# Patient Record
Sex: FEMALE | Race: WHITE | Employment: OTHER | URBAN - METROPOLITAN AREA
[De-identification: names, ages, dates, MRNs, and addresses within clinical notes are randomized per-mention and may not be internally consistent; named-entity substitution may affect disease eponyms.]

---

## 2024-01-15 ENCOUNTER — OFFICE VISIT (OUTPATIENT)
Dept: FAMILY MEDICINE CLINIC | Age: 74
End: 2024-01-15
Payer: MEDICARE

## 2024-01-15 VITALS
HEIGHT: 61 IN | SYSTOLIC BLOOD PRESSURE: 120 MMHG | BODY MASS INDEX: 23 KG/M2 | DIASTOLIC BLOOD PRESSURE: 80 MMHG | OXYGEN SATURATION: 99 % | TEMPERATURE: 97.3 F | HEART RATE: 83 BPM | WEIGHT: 121.8 LBS

## 2024-01-15 DIAGNOSIS — R10.13 DYSPEPSIA: ICD-10-CM

## 2024-01-15 DIAGNOSIS — Z78.0 MENOPAUSE: ICD-10-CM

## 2024-01-15 DIAGNOSIS — K80.20 CALCULUS OF GALLBLADDER WITHOUT CHOLECYSTITIS WITHOUT OBSTRUCTION: ICD-10-CM

## 2024-01-15 DIAGNOSIS — R70.0 ELEVATED ERYTHROCYTE SEDIMENTATION RATE: ICD-10-CM

## 2024-01-15 DIAGNOSIS — G89.29 CHRONIC LEFT SHOULDER PAIN: ICD-10-CM

## 2024-01-15 DIAGNOSIS — E78.1: ICD-10-CM

## 2024-01-15 DIAGNOSIS — Z12.31 SCREENING MAMMOGRAM FOR BREAST CANCER: ICD-10-CM

## 2024-01-15 DIAGNOSIS — Z00.00 INITIAL MEDICARE ANNUAL WELLNESS VISIT: Primary | ICD-10-CM

## 2024-01-15 DIAGNOSIS — M25.512 CHRONIC LEFT SHOULDER PAIN: ICD-10-CM

## 2024-01-15 DIAGNOSIS — R73.02 IMPAIRED GLUCOSE TOLERANCE (ORAL): ICD-10-CM

## 2024-01-15 DIAGNOSIS — M85.80 OSTEOPENIA, UNSPECIFIED LOCATION: ICD-10-CM

## 2024-01-15 DIAGNOSIS — E88.09 HYPERPROTEINEMIA: ICD-10-CM

## 2024-01-15 DIAGNOSIS — E87.6 HYPOKALEMIA: ICD-10-CM

## 2024-01-15 DIAGNOSIS — Z11.59 SCREENING FOR VIRAL DISEASE: ICD-10-CM

## 2024-01-15 PROBLEM — H43.819 POSTERIOR VITREOUS DETACHMENT: Status: ACTIVE | Noted: 2024-01-15

## 2024-01-15 PROBLEM — F33.42 RECURRENT MAJOR DEPRESSION IN FULL REMISSION (HCC): Status: ACTIVE | Noted: 2024-01-15

## 2024-01-15 PROBLEM — Z22.7 LATENT TUBERCULOSIS: Status: ACTIVE | Noted: 2024-01-15

## 2024-01-15 PROBLEM — H52.209 ASTIGMATISM: Status: ACTIVE | Noted: 2024-01-15

## 2024-01-15 PROBLEM — Z86.59 HISTORY OF DEPRESSION: Status: ACTIVE | Noted: 2024-01-15

## 2024-01-15 PROBLEM — J30.9 ALLERGIC RHINITIS: Status: ACTIVE | Noted: 2024-01-15

## 2024-01-15 PROBLEM — C73 FOLLICULAR CARCINOMA OF THYROID GLAND (HCC): Status: ACTIVE | Noted: 2024-01-15

## 2024-01-15 PROBLEM — N81.6 HERNIATION OF RECTUM INTO VAGINA: Status: ACTIVE | Noted: 2024-01-15

## 2024-01-15 PROBLEM — M81.0 OSTEOPOROSIS: Status: RESOLVED | Noted: 2024-01-15 | Resolved: 2024-01-15

## 2024-01-15 PROBLEM — B00.9 HERPES SIMPLEX: Status: ACTIVE | Noted: 2024-01-15

## 2024-01-15 PROBLEM — M75.01 ADHESIVE CAPSULITIS OF BOTH SHOULDERS: Status: ACTIVE | Noted: 2022-11-30

## 2024-01-15 PROBLEM — G56.00 CARPAL TUNNEL SYNDROME: Status: ACTIVE | Noted: 2024-01-15

## 2024-01-15 PROBLEM — H44.119 PANUVEITIS: Status: ACTIVE | Noted: 2024-01-15

## 2024-01-15 PROBLEM — Q76.5 CERVICAL RIB: Status: ACTIVE | Noted: 2024-01-15

## 2024-01-15 PROBLEM — H30.91: Status: ACTIVE | Noted: 2024-01-15

## 2024-01-15 PROBLEM — M75.02 ADHESIVE CAPSULITIS OF BOTH SHOULDERS: Status: ACTIVE | Noted: 2022-11-30

## 2024-01-15 PROBLEM — G90.50 COMPLEX REGIONAL PAIN SYNDROME TYPE I: Status: ACTIVE | Noted: 2024-01-15

## 2024-01-15 PROBLEM — R76.11 TUBERCULIN SKIN TEST POSITIVE: Status: ACTIVE | Noted: 2022-09-27

## 2024-01-15 PROBLEM — M81.0 OSTEOPOROSIS: Status: ACTIVE | Noted: 2024-01-15

## 2024-01-15 PROCEDURE — 1090F PRES/ABSN URINE INCON ASSESS: CPT | Performed by: FAMILY MEDICINE

## 2024-01-15 PROCEDURE — 1123F ACP DISCUSS/DSCN MKR DOCD: CPT | Performed by: FAMILY MEDICINE

## 2024-01-15 PROCEDURE — G0438 PPPS, INITIAL VISIT: HCPCS | Performed by: FAMILY MEDICINE

## 2024-01-15 PROCEDURE — G8427 DOCREV CUR MEDS BY ELIG CLIN: HCPCS | Performed by: FAMILY MEDICINE

## 2024-01-15 PROCEDURE — G8484 FLU IMMUNIZE NO ADMIN: HCPCS | Performed by: FAMILY MEDICINE

## 2024-01-15 PROCEDURE — 3017F COLORECTAL CA SCREEN DOC REV: CPT | Performed by: FAMILY MEDICINE

## 2024-01-15 PROCEDURE — 4004F PT TOBACCO SCREEN RCVD TLK: CPT | Performed by: FAMILY MEDICINE

## 2024-01-15 PROCEDURE — G8420 CALC BMI NORM PARAMETERS: HCPCS | Performed by: FAMILY MEDICINE

## 2024-01-15 PROCEDURE — 99204 OFFICE O/P NEW MOD 45 MIN: CPT | Performed by: FAMILY MEDICINE

## 2024-01-15 PROCEDURE — G8400 PT W/DXA NO RESULTS DOC: HCPCS | Performed by: FAMILY MEDICINE

## 2024-01-15 RX ORDER — FAMOTIDINE 40 MG/1
40 TABLET, FILM COATED ORAL 2 TIMES DAILY
Qty: 180 TABLET | Refills: 3 | Status: SHIPPED | OUTPATIENT
Start: 2024-01-15

## 2024-01-15 RX ORDER — ROSUVASTATIN CALCIUM 10 MG/1
10 TABLET, COATED ORAL DAILY
Qty: 90 TABLET | Refills: 1 | Status: SHIPPED | OUTPATIENT
Start: 2024-01-15

## 2024-01-15 RX ORDER — CONJUGATED ESTROGENS 0.62 MG/G
CREAM VAGINAL
Qty: 30 G | Refills: 5 | Status: SHIPPED | OUTPATIENT
Start: 2024-01-15 | End: 2024-01-16 | Stop reason: SDUPTHER

## 2024-01-15 RX ORDER — FAMOTIDINE 40 MG/1
40 TABLET, FILM COATED ORAL 2 TIMES DAILY
COMMUNITY
Start: 2023-07-24 | End: 2024-01-15 | Stop reason: SDUPTHER

## 2024-01-15 RX ORDER — SIMVASTATIN 10 MG
10 TABLET ORAL
COMMUNITY
Start: 2023-08-23 | End: 2024-01-15 | Stop reason: ALTCHOICE

## 2024-01-15 RX ORDER — ACETAMINOPHEN 500 MG
500 TABLET ORAL EVERY 6 HOURS PRN
COMMUNITY

## 2024-01-15 RX ORDER — LEVOTHYROXINE SODIUM 88 UG/1
88 TABLET ORAL
COMMUNITY
Start: 2023-01-17 | End: 2024-07-17

## 2024-01-15 RX ORDER — CONJUGATED ESTROGENS 0.62 MG/G
CREAM VAGINAL
COMMUNITY
Start: 2023-04-24 | End: 2024-01-15 | Stop reason: SDUPTHER

## 2024-01-15 SDOH — ECONOMIC STABILITY: HOUSING INSECURITY
IN THE LAST 12 MONTHS, WAS THERE A TIME WHEN YOU DID NOT HAVE A STEADY PLACE TO SLEEP OR SLEPT IN A SHELTER (INCLUDING NOW)?: NO

## 2024-01-15 SDOH — ECONOMIC STABILITY: FOOD INSECURITY: WITHIN THE PAST 12 MONTHS, YOU WORRIED THAT YOUR FOOD WOULD RUN OUT BEFORE YOU GOT MONEY TO BUY MORE.: NEVER TRUE

## 2024-01-15 SDOH — ECONOMIC STABILITY: FOOD INSECURITY: WITHIN THE PAST 12 MONTHS, THE FOOD YOU BOUGHT JUST DIDN'T LAST AND YOU DIDN'T HAVE MONEY TO GET MORE.: NEVER TRUE

## 2024-01-15 SDOH — ECONOMIC STABILITY: INCOME INSECURITY: HOW HARD IS IT FOR YOU TO PAY FOR THE VERY BASICS LIKE FOOD, HOUSING, MEDICAL CARE, AND HEATING?: NOT HARD AT ALL

## 2024-01-15 ASSESSMENT — PATIENT HEALTH QUESTIONNAIRE - PHQ9
2. FEELING DOWN, DEPRESSED OR HOPELESS: 0
1. LITTLE INTEREST OR PLEASURE IN DOING THINGS: 0
SUM OF ALL RESPONSES TO PHQ QUESTIONS 1-9: 0
SUM OF ALL RESPONSES TO PHQ9 QUESTIONS 1 & 2: 0
SUM OF ALL RESPONSES TO PHQ QUESTIONS 1-9: 0

## 2024-01-15 ASSESSMENT — VISUAL ACUITY: OD_CC: 20/30-0

## 2024-01-15 ASSESSMENT — LIFESTYLE VARIABLES
HOW MANY STANDARD DRINKS CONTAINING ALCOHOL DO YOU HAVE ON A TYPICAL DAY: PATIENT DOES NOT DRINK
HOW OFTEN DO YOU HAVE A DRINK CONTAINING ALCOHOL: NEVER

## 2024-01-15 NOTE — PROGRESS NOTES
Scan, and colonoscopy have been entered.   
highly suspicious of rheumatoid arthritis.  She does report a lot of muscle pain.  PMR is a possible diagnosis.  Orders:  -     C-Reactive Protein  -     Sedimentation Rate  Hypokalemia  Assessment & Plan:   Will monitor with a CMP.  Orders:  -     Comprehensive Metabolic Panel  Hyperproteinemia  Assessment & Plan:   Will check a CMP.  Orders:  -     Comprehensive Metabolic Panel  Menopause  Assessment & Plan:   Continue vaginal cream about twice a week  Orders:  -     estrogens conjugated (PREMARIN) 0.625 MG/GM CREA vaginal cream; See dose instructions in comments, # 30 g, 3 total refill(s), Acute, Disp-30 g, R-5, Normal  Impaired glucose tolerance (oral)  Assessment & Plan:   Documenting impaired glucose tolerance.  Navya type IV hyperlipoproteinemia  Assessment & Plan:   Will check a lipid panel  Orders:  -     rosuvastatin (CRESTOR) 10 MG tablet; Take 1 tablet by mouth daily, Disp-90 tablet, R-1Normal  -     Lipid Panel  Osteopenia, unspecified location  Assessment & Plan:   Recommended strength exercise.  Screening for viral disease  -     Hepatitis C Antibody  Dyspepsia  Assessment & Plan:   Continue famotidine twice a day which she states helps her.  Orders:  -     famotidine (PEPCID) 40 MG tablet; Take 1 tablet by mouth 2 times daily, Disp-180 tablet, R-3Normal  Screening mammogram for breast cancer  -     [facility] ZANDRA DIGITAL SCREEN BILATERAL; Future    Recommendations for Preventive Services Due: see orders and patient instructions/AVS.  Recommended screening schedule for the next 5-10 years is provided to the patient in written form: see Patient Instructions/AVS.     No follow-ups on file.     Subjective   See the note above.    Patient's complete Health Risk Assessment and screening values have been reviewed and are found in Flowsheets. The following problems were reviewed today and where indicated follow up appointments were made and/or referrals ordered.    Positive Risk Factor Screenings with

## 2024-01-15 NOTE — ASSESSMENT & PLAN NOTE
I will check an ESR and CRP.  She did not report enough stiffness for me to be highly suspicious of rheumatoid arthritis.  She does report a lot of muscle pain.  PMR is a possible diagnosis.

## 2024-01-15 NOTE — PATIENT INSTRUCTIONS
receiving TTY. The message is shown on a monitor.  Use text messaging, social media, and email if it is hard for you to communicate by telephone.  Try to learn a listening technique called speechreading. It is not lipreading. You pay attention to people's gestures, expressions, posture, and tone of voice. These clues can help you understand what a person is saying. Face the person you are talking to, and have them face you. Make sure the lighting is good. You need to see the other person's face clearly.  Think about counseling if you need help to adjust to your hearing loss.  When should you call for help?  Watch closely for changes in your health, and be sure to contact your doctor if:    You think your hearing is getting worse.     You have new symptoms, such as dizziness or nausea.   Where can you learn more?  Go to https://www.[employer].net/patientEd and enter R798 to learn more about \"Hearing Loss: Care Instructions.\"  Current as of: February 28, 2023               Content Version: 13.9  © 2006-2023 [employer].   Care instructions adapted under license by [employer]. If you have questions about a medical condition or this instruction, always ask your healthcare professional. [employer] disclaims any warranty or liability for your use of this information.           Learning About Vision Tests  What are vision tests?     The four most common vision tests are visual acuity tests, refraction, visual field tests, and color vision tests.  Visual acuity (sharpness) tests  These tests are used:  To see if you need glasses or contact lenses.  To monitor an eye problem.  To check an eye injury.  Visual acuity tests are done as part of routine exams. You may also have this test when you get your 's license or apply for some types of jobs.  Visual field tests  These tests are used:  To check for vision loss in any area of your range of vision.  To screen for certain eye diseases.  To

## 2024-01-15 NOTE — ASSESSMENT & PLAN NOTE
I will order x-rays of left shoulder and recommended that she get physical therapy for her left shoulder.  I suspect a supraspinatus tendon problem

## 2024-01-16 ENCOUNTER — TELEPHONE (OUTPATIENT)
Dept: FAMILY MEDICINE CLINIC | Age: 74
End: 2024-01-16

## 2024-01-16 DIAGNOSIS — Z78.0 MENOPAUSE: ICD-10-CM

## 2024-01-16 LAB
ALBUMIN SERPL-MCNC: 4.7 G/DL (ref 3.4–5)
ALBUMIN/GLOB SERPL: 1.1 {RATIO} (ref 1.1–2.2)
ALP SERPL-CCNC: 74 U/L (ref 40–129)
ALT SERPL-CCNC: 9 U/L (ref 10–40)
ANION GAP SERPL CALCULATED.3IONS-SCNC: 14 MMOL/L (ref 3–16)
AST SERPL-CCNC: 18 U/L (ref 15–37)
BILIRUB SERPL-MCNC: 0.4 MG/DL (ref 0–1)
BUN SERPL-MCNC: 9 MG/DL (ref 7–20)
CALCIUM SERPL-MCNC: 9.3 MG/DL (ref 8.3–10.6)
CHLORIDE SERPL-SCNC: 104 MMOL/L (ref 99–110)
CHOLEST SERPL-MCNC: 194 MG/DL (ref 0–199)
CO2 SERPL-SCNC: 22 MMOL/L (ref 21–32)
CREAT SERPL-MCNC: <0.5 MG/DL (ref 0.6–1.2)
CRP SERPL-MCNC: 11.1 MG/L (ref 0–5.1)
ERYTHROCYTE [SEDIMENTATION RATE] IN BLOOD BY WESTERGREN METHOD: 65 MM/HR (ref 0–30)
GFR SERPLBLD CREATININE-BSD FMLA CKD-EPI: >60 ML/MIN/{1.73_M2}
GLUCOSE SERPL-MCNC: 83 MG/DL (ref 70–99)
HCV AB SERPL QL IA: NORMAL
HDLC SERPL-MCNC: 63 MG/DL (ref 40–60)
LDLC SERPL CALC-MCNC: 88 MG/DL
POTASSIUM SERPL-SCNC: 4 MMOL/L (ref 3.5–5.1)
PROT SERPL-MCNC: 9.1 G/DL (ref 6.4–8.2)
SODIUM SERPL-SCNC: 140 MMOL/L (ref 136–145)
TRIGL SERPL-MCNC: 213 MG/DL (ref 0–150)
VLDLC SERPL CALC-MCNC: 43 MG/DL

## 2024-01-16 RX ORDER — CONJUGATED ESTROGENS 0.62 MG/G
CREAM VAGINAL
Qty: 30 G | Refills: 5 | Status: SHIPPED | OUTPATIENT
Start: 2024-01-16 | End: 2024-01-17 | Stop reason: SDUPTHER

## 2024-01-17 LAB
ALBUMIN SERPL ELPH-MCNC: 3.6 G/DL (ref 3.1–4.9)
ALPHA1 GLOB SERPL ELPH-MCNC: 0.3 G/DL (ref 0.2–0.4)
ALPHA2 GLOB SERPL ELPH-MCNC: 1 G/DL (ref 0.4–1.1)
B-GLOBULIN SERPL ELPH-MCNC: 1.4 G/DL (ref 0.9–1.6)
GAMMA GLOB SERPL ELPH-MCNC: 2.9 G/DL (ref 0.6–1.8)
SPE/IFE INTERPRETATION: NORMAL

## 2024-01-17 RX ORDER — CONJUGATED ESTROGENS 0.62 MG/G
CREAM VAGINAL
Qty: 30 G | Refills: 5 | Status: SHIPPED | OUTPATIENT
Start: 2024-01-17

## 2024-01-17 NOTE — TELEPHONE ENCOUNTER
Pharmacy needs more clarification on the estrogens conjugated (PREMARIN). They need an exact dosage on the sig. She states in the past the patient has had sigs that state for half a gram 2-3 times weekly. \"One applicator full\" is a total of 2 grams of medication so it would be quadrupling the dose of what the patient has taken in the past. Changing that to a dosage in grams should be the clarification they need.

## 2024-01-24 ENCOUNTER — HOSPITAL ENCOUNTER (OUTPATIENT)
Dept: INFUSION THERAPY | Age: 74
Discharge: HOME | End: 2024-01-24
Payer: MEDICARE

## 2024-01-24 ENCOUNTER — INITIAL CONSULT (OUTPATIENT)
Dept: ONCOLOGY | Age: 74
End: 2024-01-24
Payer: MEDICARE

## 2024-01-24 VITALS
WEIGHT: 120 LBS | OXYGEN SATURATION: 96 % | DIASTOLIC BLOOD PRESSURE: 81 MMHG | HEART RATE: 98 BPM | BODY MASS INDEX: 22.66 KG/M2 | TEMPERATURE: 97.2 F | HEIGHT: 61 IN | SYSTOLIC BLOOD PRESSURE: 159 MMHG

## 2024-01-24 DIAGNOSIS — R70.0 ELEVATED ERYTHROCYTE SEDIMENTATION RATE: ICD-10-CM

## 2024-01-24 DIAGNOSIS — C73 FOLLICULAR CARCINOMA OF THYROID GLAND (HCC): ICD-10-CM

## 2024-01-24 DIAGNOSIS — D72.829 LEUKOCYTOSIS, UNSPECIFIED TYPE: ICD-10-CM

## 2024-01-24 DIAGNOSIS — C73 FOLLICULAR CARCINOMA OF THYROID GLAND (HCC): Primary | ICD-10-CM

## 2024-01-24 LAB
ALBUMIN SERPL-MCNC: 4.6 GM/DL (ref 3.4–5)
ALP BLD-CCNC: 68 IU/L (ref 40–129)
ALT SERPL-CCNC: 9 U/L (ref 10–40)
ANION GAP SERPL CALCULATED.3IONS-SCNC: 13 MMOL/L (ref 7–16)
AST SERPL-CCNC: 17 IU/L (ref 15–37)
BILIRUB SERPL-MCNC: 0.3 MG/DL (ref 0–1)
BUN SERPL-MCNC: 6 MG/DL (ref 6–23)
CALCIUM SERPL-MCNC: 9.2 MG/DL (ref 8.3–10.6)
CHLORIDE BLD-SCNC: 103 MMOL/L (ref 99–110)
CO2: 23 MMOL/L (ref 21–32)
COMMENT: ABNORMAL
CREAT SERPL-MCNC: 0.5 MG/DL (ref 0.6–1.1)
DIFFERENTIAL TYPE: ABNORMAL
EOSINOPHILS ABSOLUTE: 0.1 K/CU MM
EOSINOPHILS RELATIVE PERCENT: 1 % (ref 0–3)
ERYTHROCYTE SEDIMENTATION RATE: 85 MM/HR (ref 0–30)
GFR SERPL CREATININE-BSD FRML MDRD: >60 ML/MIN/1.73M2
GLUCOSE SERPL-MCNC: 89 MG/DL (ref 70–99)
HCT VFR BLD CALC: 39.3 % (ref 37–47)
HEMOGLOBIN: 13 GM/DL (ref 12.5–16)
IGA: 607 MG/DL (ref 69–382)
IGG,SERUM: 2469 MG/DL (ref 723–1685)
IGM,SERUM: 275 MG/DL (ref 62–277)
LACTATE DEHYDROGENASE: 172 IU/L (ref 120–246)
LYMPHOCYTES ABSOLUTE: 1.6 K/CU MM
LYMPHOCYTES RELATIVE PERCENT: 30 % (ref 24–44)
MCH RBC QN AUTO: 30.4 PG (ref 27–31)
MCHC RBC AUTO-ENTMCNC: 33.1 % (ref 32–36)
MCV RBC AUTO: 91.8 FL (ref 78–100)
MONOCYTES ABSOLUTE: 1.2 K/CU MM
MONOCYTES RELATIVE PERCENT: 22 % (ref 0–4)
PDW BLD-RTO: 13.1 % (ref 11.7–14.9)
PLATELET # BLD: 263 K/CU MM (ref 140–440)
PMV BLD AUTO: 9 FL (ref 7.5–11.1)
POTASSIUM SERPL-SCNC: 3.7 MMOL/L (ref 3.5–5.1)
RBC # BLD: 4.28 M/CU MM (ref 4.2–5.4)
SEGMENTED NEUTROPHILS ABSOLUTE COUNT: 2.5 K/CU MM
SEGMENTED NEUTROPHILS RELATIVE PERCENT: 47 % (ref 36–66)
SODIUM BLD-SCNC: 139 MMOL/L (ref 135–145)
TOTAL PROTEIN: 9.3 GM/DL (ref 6.4–8.2)
TOTAL PROTEIN: 9.3 GM/DL (ref 6.4–8.2)
URATE SERPL-MCNC: 4.1 MG/DL (ref 2.6–6)
WBC # BLD: 5.4 K/CU MM (ref 4–10.5)

## 2024-01-24 PROCEDURE — 86320 SERUM IMMUNOELECTROPHORESIS: CPT

## 2024-01-24 PROCEDURE — 99202 OFFICE O/P NEW SF 15 MIN: CPT

## 2024-01-24 PROCEDURE — 80053 COMPREHEN METABOLIC PANEL: CPT

## 2024-01-24 PROCEDURE — 3017F COLORECTAL CA SCREEN DOC REV: CPT | Performed by: INTERNAL MEDICINE

## 2024-01-24 PROCEDURE — 85027 COMPLETE CBC AUTOMATED: CPT

## 2024-01-24 PROCEDURE — 1036F TOBACCO NON-USER: CPT | Performed by: INTERNAL MEDICINE

## 2024-01-24 PROCEDURE — 1090F PRES/ABSN URINE INCON ASSESS: CPT | Performed by: INTERNAL MEDICINE

## 2024-01-24 PROCEDURE — 1123F ACP DISCUSS/DSCN MKR DOCD: CPT | Performed by: INTERNAL MEDICINE

## 2024-01-24 PROCEDURE — 84550 ASSAY OF BLOOD/URIC ACID: CPT

## 2024-01-24 PROCEDURE — 84155 ASSAY OF PROTEIN SERUM: CPT

## 2024-01-24 PROCEDURE — G8427 DOCREV CUR MEDS BY ELIG CLIN: HCPCS | Performed by: INTERNAL MEDICINE

## 2024-01-24 PROCEDURE — 86038 ANTINUCLEAR ANTIBODIES: CPT

## 2024-01-24 PROCEDURE — G8484 FLU IMMUNIZE NO ADMIN: HCPCS | Performed by: INTERNAL MEDICINE

## 2024-01-24 PROCEDURE — 36415 COLL VENOUS BLD VENIPUNCTURE: CPT

## 2024-01-24 PROCEDURE — 85652 RBC SED RATE AUTOMATED: CPT

## 2024-01-24 PROCEDURE — 82232 ASSAY OF BETA-2 PROTEIN: CPT

## 2024-01-24 PROCEDURE — 83615 LACTATE (LD) (LDH) ENZYME: CPT

## 2024-01-24 PROCEDURE — 99204 OFFICE O/P NEW MOD 45 MIN: CPT | Performed by: INTERNAL MEDICINE

## 2024-01-24 PROCEDURE — G8420 CALC BMI NORM PARAMETERS: HCPCS | Performed by: INTERNAL MEDICINE

## 2024-01-24 PROCEDURE — 86430 RHEUMATOID FACTOR TEST QUAL: CPT

## 2024-01-24 PROCEDURE — 85007 BL SMEAR W/DIFF WBC COUNT: CPT

## 2024-01-24 PROCEDURE — 84165 PROTEIN E-PHORESIS SERUM: CPT

## 2024-01-24 PROCEDURE — 82784 ASSAY IGA/IGD/IGG/IGM EACH: CPT

## 2024-01-24 PROCEDURE — G8400 PT W/DXA NO RESULTS DOC: HCPCS | Performed by: INTERNAL MEDICINE

## 2024-01-24 PROCEDURE — 86039 ANTINUCLEAR ANTIBODIES (ANA): CPT

## 2024-01-24 PROCEDURE — 83883 ASSAY NEPHELOMETRY NOT SPEC: CPT

## 2024-01-24 ASSESSMENT — PATIENT HEALTH QUESTIONNAIRE - PHQ9
SUM OF ALL RESPONSES TO PHQ9 QUESTIONS 1 & 2: 0
SUM OF ALL RESPONSES TO PHQ QUESTIONS 1-9: 0
1. LITTLE INTEREST OR PLEASURE IN DOING THINGS: 0
SUM OF ALL RESPONSES TO PHQ QUESTIONS 1-9: 0
SUM OF ALL RESPONSES TO PHQ QUESTIONS 1-9: 0
2. FEELING DOWN, DEPRESSED OR HOPELESS: 0
SUM OF ALL RESPONSES TO PHQ QUESTIONS 1-9: 0

## 2024-01-25 LAB
B2 MICROGLOB SERPL-MCNC: 1.6 MG/L
RHEUMATOID FACTOR: 13 IU/ML (ref 0–14)
SMEAR REVIEW: NORMAL

## 2024-01-26 DIAGNOSIS — R76.8 ELEVATED ANTINUCLEAR ANTIBODY (ANA) LEVEL: Primary | ICD-10-CM

## 2024-01-26 LAB
ANA PATTERN: ABNORMAL
ANA TITER: ABNORMAL
ANTINUCLEAR ANTIBODY, HEP-2, IGG: DETECTED
INTERPRETATION: ABNORMAL
KAPPA LC FREE SER-MCNC: 40.14 MG/L (ref 3.3–19.4)
KAPPA LC FREE/LAMBDA FREE SER NEPH: 1.42 {RATIO} (ref 0.26–1.65)
LAMBDA LC FREE SERPL-MCNC: 28.25 MG/L (ref 5.71–26.3)
NUCLEAR IGG SER QL IA: DETECTED

## 2024-01-30 LAB
ALBUMIN SERPL ELPH-MCNC: 4.2 GM/DL (ref 3.2–5.6)
ALPHA-1-GLOBULIN: 0.3 GM/DL (ref 0.1–0.4)
ALPHA-2-GLOBULIN: 0.9 GM/DL (ref 0.4–1.2)
BETA GLOBULIN: 1.3 GM/DL (ref 0.5–1.3)
GAMMA GLOBULIN: 2.7 GM/DL (ref 0.5–1.6)
SPEP INTERPRETATION: ABNORMAL
SPEP INTERPRETATION: NORMAL
TOTAL PROTEIN: 9.3 GM/DL (ref 6.4–8.2)

## 2024-02-05 ENCOUNTER — OFFICE VISIT (OUTPATIENT)
Dept: FAMILY MEDICINE CLINIC | Age: 74
End: 2024-02-05
Payer: MEDICARE

## 2024-02-05 VITALS
DIASTOLIC BLOOD PRESSURE: 82 MMHG | BODY MASS INDEX: 22.66 KG/M2 | WEIGHT: 120 LBS | OXYGEN SATURATION: 100 % | TEMPERATURE: 97.5 F | HEART RATE: 76 BPM | SYSTOLIC BLOOD PRESSURE: 130 MMHG | HEIGHT: 61 IN

## 2024-02-05 DIAGNOSIS — R76.8 ELEVATED ANTINUCLEAR ANTIBODY (ANA) LEVEL: ICD-10-CM

## 2024-02-05 DIAGNOSIS — R70.0 ELEVATED ERYTHROCYTE SEDIMENTATION RATE: Primary | ICD-10-CM

## 2024-02-05 DIAGNOSIS — M25.512 CHRONIC LEFT SHOULDER PAIN: ICD-10-CM

## 2024-02-05 DIAGNOSIS — M85.80 OSTEOPENIA, UNSPECIFIED LOCATION: ICD-10-CM

## 2024-02-05 DIAGNOSIS — G89.29 CHRONIC LEFT SHOULDER PAIN: ICD-10-CM

## 2024-02-05 DIAGNOSIS — E78.1: ICD-10-CM

## 2024-02-05 PROCEDURE — 1090F PRES/ABSN URINE INCON ASSESS: CPT | Performed by: FAMILY MEDICINE

## 2024-02-05 PROCEDURE — G8400 PT W/DXA NO RESULTS DOC: HCPCS | Performed by: FAMILY MEDICINE

## 2024-02-05 PROCEDURE — 1036F TOBACCO NON-USER: CPT | Performed by: FAMILY MEDICINE

## 2024-02-05 PROCEDURE — G8484 FLU IMMUNIZE NO ADMIN: HCPCS | Performed by: FAMILY MEDICINE

## 2024-02-05 PROCEDURE — G8420 CALC BMI NORM PARAMETERS: HCPCS | Performed by: FAMILY MEDICINE

## 2024-02-05 PROCEDURE — G8427 DOCREV CUR MEDS BY ELIG CLIN: HCPCS | Performed by: FAMILY MEDICINE

## 2024-02-05 PROCEDURE — 3017F COLORECTAL CA SCREEN DOC REV: CPT | Performed by: FAMILY MEDICINE

## 2024-02-05 PROCEDURE — 1123F ACP DISCUSS/DSCN MKR DOCD: CPT | Performed by: FAMILY MEDICINE

## 2024-02-05 PROCEDURE — 99214 OFFICE O/P EST MOD 30 MIN: CPT | Performed by: FAMILY MEDICINE

## 2024-02-05 RX ORDER — MELOXICAM 7.5 MG/1
7.5 TABLET ORAL DAILY
Qty: 30 TABLET | Refills: 2 | Status: SHIPPED | OUTPATIENT
Start: 2024-02-05

## 2024-02-05 NOTE — ASSESSMENT & PLAN NOTE
Encouraged continuing vitamin D3 5000 units.   
 She has a high ESR and CRP and CONSTANTINO.  I will refer to a rheumatologist.  She also has high total protein in the blood with high gammaglobulin  
 She is doing well with rosuvastatin.  HLD is stable.  
Shoulder pain is not adequately controlled.  I will prescribe meloxicam for left shoulder pain.  I told her that if she has increased stomach symptoms that she may want to stop it.  
[id number]

## 2024-02-05 NOTE — PROGRESS NOTES
2/5/24    Mary Kate Rangel  1950    SUBJECTIVE    HPI Iggy Hartmann is a 73 y.o. female who presents today for evaluation of:  Chief Complaint   Patient presents with    Other     Blood work results      High CONSTANTINO : was told to follow up by hematologist who had a high CONSTANTINO titier result. +L shoulder pain. Duration 1 month. Rt arm has had pain in the past.no joint swelling. No rash. Increased L shoulder pauin L shoulder in the mornings.  She would like a medication that is an anti-inflammatory.    GERD : Her stomach symptoms are well-managed with 1 famotidine daily.  Her prescription says twice a day but she finds that once a day is all she needs.    HLD : She has switched from Zocor to rosuvastatin and is doing all right.    Allergies   Allergen Reactions    Adhesive Tape Rash    Iodine Rash     Other reaction(s): Rash    PER PATIENT THIS WAS REACTION TO IODINE SWAB AND LEFT SMALL SKIN DISTURBANCE. NO PRIOR HX OF        OBJECTIVE    /82 (Site: Left Upper Arm, Position: Sitting, Cuff Size: Medium Adult)   Pulse 76   Temp 97.5 °F (36.4 °C) (Infrared)   Ht 1.549 m (5' 1\")   Wt 54.4 kg (120 lb)   SpO2 100%   BMI 22.67 kg/m²     Physical Exam   Constitutional:       General: Not in acute distress.     Appearance: Normal appearance. Not ill-appearing.   Eyes:      General: No scleral icterus.  Cardiovascular:      Rate and Rhythm: Normal rate and regular rhythm.      Heart sounds: No murmur heard.   No friction rub. No gallop.    Pulmonary:      Effort: Pulmonary effort is normal. No respiratory distress.      Breath sounds: No wheezing, rhonchi or rales.   Abdominal:      Palpations: Abdomen is soft. There is no mass.      Tenderness: There is no abdominal tenderness.   Musculoskeletal:     Moves all extremities normally.  No tenderness or swelling L and R MCPJs. No arthritic nodules on fingers.   Skin:     General: Skin is warm.      Coloration: Skin is not jaundiced.   Neurological:      Mental Status: Patient is

## 2024-02-13 LAB — COMMENT: NORMAL

## 2024-02-22 ENCOUNTER — HOSPITAL ENCOUNTER (OUTPATIENT)
Dept: INFUSION THERAPY | Age: 74
Discharge: HOME | End: 2024-02-22
Payer: MEDICARE

## 2024-02-22 ENCOUNTER — OFFICE VISIT (OUTPATIENT)
Dept: ONCOLOGY | Age: 74
End: 2024-02-22
Payer: MEDICARE

## 2024-02-22 VITALS
BODY MASS INDEX: 22.47 KG/M2 | HEIGHT: 61 IN | WEIGHT: 119 LBS | DIASTOLIC BLOOD PRESSURE: 72 MMHG | HEART RATE: 73 BPM | OXYGEN SATURATION: 99 % | TEMPERATURE: 98.7 F | SYSTOLIC BLOOD PRESSURE: 137 MMHG

## 2024-02-22 DIAGNOSIS — R70.0 ELEVATED ERYTHROCYTE SEDIMENTATION RATE: ICD-10-CM

## 2024-02-22 DIAGNOSIS — D72.829 LEUKOCYTOSIS, UNSPECIFIED TYPE: ICD-10-CM

## 2024-02-22 DIAGNOSIS — C73 FOLLICULAR CARCINOMA OF THYROID GLAND (HCC): Primary | ICD-10-CM

## 2024-02-22 PROCEDURE — 99214 OFFICE O/P EST MOD 30 MIN: CPT | Performed by: INTERNAL MEDICINE

## 2024-02-22 PROCEDURE — G8400 PT W/DXA NO RESULTS DOC: HCPCS | Performed by: INTERNAL MEDICINE

## 2024-02-22 PROCEDURE — 3017F COLORECTAL CA SCREEN DOC REV: CPT | Performed by: INTERNAL MEDICINE

## 2024-02-22 PROCEDURE — G8428 CUR MEDS NOT DOCUMENT: HCPCS | Performed by: INTERNAL MEDICINE

## 2024-02-22 PROCEDURE — G8484 FLU IMMUNIZE NO ADMIN: HCPCS | Performed by: INTERNAL MEDICINE

## 2024-02-22 PROCEDURE — 1123F ACP DISCUSS/DSCN MKR DOCD: CPT | Performed by: INTERNAL MEDICINE

## 2024-02-22 PROCEDURE — G8420 CALC BMI NORM PARAMETERS: HCPCS | Performed by: INTERNAL MEDICINE

## 2024-02-22 PROCEDURE — 99211 OFF/OP EST MAY X REQ PHY/QHP: CPT

## 2024-02-22 PROCEDURE — 1090F PRES/ABSN URINE INCON ASSESS: CPT | Performed by: INTERNAL MEDICINE

## 2024-02-22 PROCEDURE — 1036F TOBACCO NON-USER: CPT | Performed by: INTERNAL MEDICINE

## 2024-02-22 NOTE — PROGRESS NOTES
Patient Name:  Mary Kate Rangel  Patient :  1950  Patient MRN:  [id number]     Primary Oncologist: Gabrielle Bloom MD  Referring Provider: Jc Tapia MD     Date of Service: 2024      Reason for Consult:  Elevated ESR      Chief Complaint:    Chief Complaint   Patient presents with    Follow-up     4 week follow up.        Encounter Diagnoses   Name Primary?    Follicular carcinoma of thyroid gland (HCC) Yes    Elevated erythrocyte sedimentation rate     Leukocytosis, unspecified type         HPI:   24: Very pleasant younger looking 73-year-old female arrived with her  to the clinic today.  She reported left shoulder pain, swelling for the past 4 months.  Restricting her range of motion.  Initially she had right shoulder pain which got better with therapy and antibiotics.  She denies any fever, night sweats, lymphadenopathy.  The weight fluctuates.  Denies any chest pain, increased shortness of breath, fever, cough.  No abdominal pain, nausea, vomiting, diarrhea, constipation.  No  symptoms.  No bleeding.  No lower extremity edema.  Denies any fatigue.    23: Osteopenia per pt. Is on tuan and vitamin D    2023 ESR 45 CMP within normal limits CBC with absolute monocyte count of 1000    2023 sed rate of 45.  Absolute monocyte count of 1700.    2023 CBC with WBC of 7.9 hemoglobin of 12.8 hematocrit 37.6 MCV of 90.3 and platelets of 256.  Absolute monocyte count of 1700.  Large platelets present.    1/15/2024 CMP with sodium of 140 potassium of 4 BUN of 9 creatinine 1.5 total protein of 9.1 albumin of 4.7, LFTs within normal limits hepatitis C negative C-reactive protein 11.1 sed rate of 65 serum protein electrophoresis with no M protein.    Mammogram 2024 normal per her    4CBC WBC of 5.4 hemoglobin of 13 hematocrit of 39.4 MCV of 91.8 platelets of 9 absolute monocyte count 1200.  CMP with potassium of 3.7 creatinine 0.5 total protein of 9.3 albumin of

## 2024-02-22 NOTE — PROGRESS NOTES
MA Rooming Questions  Patient: Mary Kate Rangel  MRN: [id number]    Date: 2/22/2024        1. Do you have any new issues?   yes - If PT is there a diagnosis since pt started coming here. .          2. Do you need any refills on medications?    no    3. Have you had any imaging done since your last visit?   no    4. Have you been hospitalized or seen in the emergency room since your last visit here?   no    5. Did the patient have a depression screening completed today? No    No data recorded     PHQ-9 Given to (if applicable):               PHQ-9 Score (if applicable):                     [] Positive     []  Negative              Does question #9 need addressed (if applicable)                     [] Yes    []  No               Jesi Iglesias MA

## 2024-02-29 ENCOUNTER — HOSPITAL ENCOUNTER (OUTPATIENT)
Age: 74
Discharge: HOME | End: 2024-02-29
Payer: MEDICARE

## 2024-02-29 ENCOUNTER — HOSPITAL ENCOUNTER (OUTPATIENT)
Dept: GENERAL RADIOLOGY | Age: 74
Discharge: HOME | End: 2024-02-29
Payer: MEDICARE

## 2024-02-29 DIAGNOSIS — M25.512 CHRONIC LEFT SHOULDER PAIN: ICD-10-CM

## 2024-02-29 DIAGNOSIS — G89.29 CHRONIC LEFT SHOULDER PAIN: ICD-10-CM

## 2024-02-29 PROCEDURE — 73030 X-RAY EXAM OF SHOULDER: CPT

## 2024-04-23 ENCOUNTER — OFFICE VISIT (OUTPATIENT)
Dept: FAMILY MEDICINE CLINIC | Age: 74
End: 2024-04-23
Payer: MEDICARE

## 2024-04-23 VITALS
HEART RATE: 77 BPM | WEIGHT: 121 LBS | DIASTOLIC BLOOD PRESSURE: 72 MMHG | SYSTOLIC BLOOD PRESSURE: 130 MMHG | OXYGEN SATURATION: 99 % | BODY MASS INDEX: 22.86 KG/M2

## 2024-04-23 DIAGNOSIS — M85.89 OSTEOPENIA OF MULTIPLE SITES: ICD-10-CM

## 2024-04-23 DIAGNOSIS — E03.9 ACQUIRED HYPOTHYROIDISM: Chronic | ICD-10-CM

## 2024-04-23 DIAGNOSIS — M75.02 ADHESIVE CAPSULITIS OF BOTH SHOULDERS: ICD-10-CM

## 2024-04-23 DIAGNOSIS — M75.01 ADHESIVE CAPSULITIS OF BOTH SHOULDERS: ICD-10-CM

## 2024-04-23 DIAGNOSIS — J30.2 SEASONAL ALLERGIC RHINITIS, UNSPECIFIED TRIGGER: ICD-10-CM

## 2024-04-23 DIAGNOSIS — M25.512 CHRONIC LEFT SHOULDER PAIN: Primary | ICD-10-CM

## 2024-04-23 DIAGNOSIS — G89.29 CHRONIC LEFT SHOULDER PAIN: Primary | ICD-10-CM

## 2024-04-23 PROCEDURE — 3017F COLORECTAL CA SCREEN DOC REV: CPT | Performed by: FAMILY MEDICINE

## 2024-04-23 PROCEDURE — G8400 PT W/DXA NO RESULTS DOC: HCPCS | Performed by: FAMILY MEDICINE

## 2024-04-23 PROCEDURE — 1090F PRES/ABSN URINE INCON ASSESS: CPT | Performed by: FAMILY MEDICINE

## 2024-04-23 PROCEDURE — G8427 DOCREV CUR MEDS BY ELIG CLIN: HCPCS | Performed by: FAMILY MEDICINE

## 2024-04-23 PROCEDURE — 99213 OFFICE O/P EST LOW 20 MIN: CPT | Performed by: FAMILY MEDICINE

## 2024-04-23 PROCEDURE — 1036F TOBACCO NON-USER: CPT | Performed by: FAMILY MEDICINE

## 2024-04-23 PROCEDURE — G8420 CALC BMI NORM PARAMETERS: HCPCS | Performed by: FAMILY MEDICINE

## 2024-04-23 PROCEDURE — 1123F ACP DISCUSS/DSCN MKR DOCD: CPT | Performed by: FAMILY MEDICINE

## 2024-04-23 RX ORDER — VALACYCLOVIR HYDROCHLORIDE 1 G/1
TABLET, FILM COATED ORAL
COMMUNITY
Start: 2024-04-16

## 2024-04-23 RX ORDER — FEXOFENADINE HCL 180 MG/1
180 TABLET ORAL DAILY
Qty: 90 TABLET | Refills: 1 | Status: SHIPPED | OUTPATIENT
Start: 2024-04-23

## 2024-04-23 NOTE — ASSESSMENT & PLAN NOTE
She has some osteopenia and is treating it with Fosamax.  The osteopenia may be due to treatment of hypothyroidism but unfortunately since she has a history of thyroid cancer we need to give higher doses of levothyroxine.

## 2024-04-23 NOTE — PROGRESS NOTES
4/23/24    Mary Kate Rangel  1950    SUBJECTIVE    EMMA Hartmann is a 73 y.o. female who presents today for evaluation of:  Chief Complaint   Patient presents with    Follow-up     Patient has a question about a new vitamin      Left shoulder : limited range of motion and XR done at [facility] shows arthritis. Interested in steroid injection.     Cough : very mild cough, has a lot of phlegm. Little bits fo bood in the mucus.         Allergies   Allergen Reactions    Adhesive Tape Rash    Iodine Rash     Other reaction(s): Rash    PER PATIENT THIS WAS REACTION TO IODINE SWAB AND LEFT SMALL SKIN DISTURBANCE. NO PRIOR HX OF        OBJECTIVE    /72 (Site: Left Upper Arm, Position: Sitting, Cuff Size: Medium Adult)   Pulse 77   Wt 54.9 kg (121 lb)   SpO2 99%   BMI 22.86 kg/m²     Physical Exam   Constitutional:       General: Not in acute distress.     Appearance: Normal appearance. Not ill-appearing.   Eyes:      General: No scleral icterus.  HENT:      Head: Normocephalic.      Right Ear: wax blockage.      Left Ear: Tympanic membrane, ear canal and external ear normal.      Nose: Nose normal. Bilateral nares do not have any blood or scabbing apparent     Right Sinus: No maxillary sinus tenderness or frontal sinus tenderness.      Left Sinus: No maxillary sinus tenderness or frontal sinus tenderness.      Mouth/Throat:      Mouth: Mucous membranes are moist.      Pharynx: No oropharyngeal exudate, posterior oropharyngeal erythema or uvula swelling.      Tonsils: No tonsillar exudate or tonsillar abscesses.  Cardiovascular:      Rate and Rhythm: Normal rate and regular rhythm.      Heart sounds: No murmur heard.   No friction rub. No gallop.    Pulmonary:      Effort: Pulmonary effort is normal. No respiratory distress.      Breath sounds: No wheezing, rhonchi or rales.   Abdominal:      Palpations: Abdomen is soft. There is no mass.      Tenderness: There is no abdominal tenderness.   Musculoskeletal:     Moves all

## 2024-04-23 NOTE — ASSESSMENT & PLAN NOTE
She is managed by a doctor in the [place] system.  Most recent TSH was slightly low which is appropriate given her history of thyroid cancer.

## 2024-04-23 NOTE — ASSESSMENT & PLAN NOTE
X-rays done recently in the [facility] system did not show arthritis but x-rays done at the [place] system did show arthritis in the left shoulder.  Will refer to orthopedic specialist.  I am not sure if adhesive capsulitis would benefit from an injection but she is interested in an injection.  I will also put in a physical therapy referral which I think is a more appropriate treatment for adhesive capsulitis.

## 2024-04-23 NOTE — ASSESSMENT & PLAN NOTE
I think the most likely reason for her nasal mucus is seasonal allergic rhinitis.  I will put out a prescription for Allegra which hopefully will help.  Since she has an upcoming appointment with an ear nose throat doctor I recommended that she ask the ear nose throat doctor to check her nose as well

## 2024-05-06 ENCOUNTER — OFFICE VISIT (OUTPATIENT)
Dept: ORTHOPEDIC SURGERY | Age: 74
End: 2024-05-06
Payer: MEDICARE

## 2024-05-06 VITALS — WEIGHT: 120 LBS | HEIGHT: 62 IN | BODY MASS INDEX: 22.08 KG/M2 | OXYGEN SATURATION: 99 % | HEART RATE: 85 BPM

## 2024-05-06 DIAGNOSIS — M75.02 ADHESIVE CAPSULITIS OF LEFT SHOULDER: Primary | ICD-10-CM

## 2024-05-06 PROCEDURE — G8400 PT W/DXA NO RESULTS DOC: HCPCS | Performed by: STUDENT IN AN ORGANIZED HEALTH CARE EDUCATION/TRAINING PROGRAM

## 2024-05-06 PROCEDURE — 1090F PRES/ABSN URINE INCON ASSESS: CPT | Performed by: STUDENT IN AN ORGANIZED HEALTH CARE EDUCATION/TRAINING PROGRAM

## 2024-05-06 PROCEDURE — G8420 CALC BMI NORM PARAMETERS: HCPCS | Performed by: STUDENT IN AN ORGANIZED HEALTH CARE EDUCATION/TRAINING PROGRAM

## 2024-05-06 PROCEDURE — 1123F ACP DISCUSS/DSCN MKR DOCD: CPT | Performed by: STUDENT IN AN ORGANIZED HEALTH CARE EDUCATION/TRAINING PROGRAM

## 2024-05-06 PROCEDURE — G8427 DOCREV CUR MEDS BY ELIG CLIN: HCPCS | Performed by: STUDENT IN AN ORGANIZED HEALTH CARE EDUCATION/TRAINING PROGRAM

## 2024-05-06 PROCEDURE — 3017F COLORECTAL CA SCREEN DOC REV: CPT | Performed by: STUDENT IN AN ORGANIZED HEALTH CARE EDUCATION/TRAINING PROGRAM

## 2024-05-06 PROCEDURE — 99203 OFFICE O/P NEW LOW 30 MIN: CPT | Performed by: STUDENT IN AN ORGANIZED HEALTH CARE EDUCATION/TRAINING PROGRAM

## 2024-05-06 PROCEDURE — 1036F TOBACCO NON-USER: CPT | Performed by: STUDENT IN AN ORGANIZED HEALTH CARE EDUCATION/TRAINING PROGRAM

## 2024-05-06 NOTE — PROGRESS NOTES
Patient is a 73 y.o. year old female. Patient is in the office today with LT shoulder pain. Patient states that she has not had an injury. Patient states that she has had pain for a year. No prior hx of injury, sx or injection. Pain scale  6/10. She has limited ROM. Her pain in located on the anterior portion of her shoulder an into her upper arm. [facility] and [facility] and has continued exercises at home.   Occupation: retired  Dominant Hand: Right

## 2024-05-06 NOTE — PROGRESS NOTES
5/6/2024   No chief complaint on file.       History of Present Illness:                             Mary Kate Rangel is a 73 y.o. female right handed patient referred by PCP for evaluation and treatment left shoulder pain.  Patient denies any specific injury.  She states that her pain in the left shoulder has been progressive slowly over the last year.  She does have a history of right shoulder adhesive capsulitis the previous year that resolved with Medrol Dosepak as well as physical therapy.  She states that the left shoulder feels very similar to what the right shoulder felt like in the past.  She has had no intervention thus far.  She was seen in the emergency room at [facility] and was told that she had arthritis and was given exercises to do which she has been doing.  She states that no significant change in her range of motion or pain.  This is my first time seeing the patient.  No advanced imaging thus far.    Patient currently retired    No prior shoulder injections or surgery on the left.  No prior significant injury on the left.    The pain occurs every day and when active. Location of pain is diffusely throughout shoulder, mostly at the anterior joint line. No history of shoulder separation or dislocation. Symptoms are aggravated by ADL's, repetitive use, work at or above shoulder height. Symptoms are diminished by rest, avoiding the painful activities.     Limited activities include: lifting, repetitive use, work at or above shoulder height, difficulty sleeping, difficulty with ADLs.  Patient does report stiffness.     Patient denies numbness, tingling, altered sensation in the extremity.    Related history: negative for prior surgery, trauma, arthritis or disorders.     Is affecting ADLs.  Pain is 6/10 at it's worst.    Outside reports reviewed: Outside imaging reviewed.     Patient's medications, allergies, past medical, surgical, social and family histories were reviewed and updated as appropriate.  CHIEF COMPLAINT:  Gynecologic examination and follow-up of birth control     SUBJECTIVE:  Criselda Rodriguez is a 34 year old       seen today for annual gynecologic examination and follow-up of birth control pills.  She is a long-time patient of Dr. Duong and has been seen here regularly.    Currently on the generic form of Reclipsen-Isibloom and has minimal periods.  She is trying to encourage her  to get a vasectomy  Has minimal bleeding, no abnormal discharge.    She is G 2 P 2002- x2-children now 12 and 7.  She had PIH and polyhydramnios with the 1st pregnancy resulting in  premature rupture of the membranes at 36 weeks  LMP:  Minimal menses on OCPs  Last pap:  11/10/2021-normal with negative HPV Co testing; remote history of ASCUS but Paps have been normal since.  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  Constitutional: Negative for fever and chills.   Skin: Negative for rash.   HEENT: Negative for sore throat.  Respiratory: Negative cough or shortness of breath.    Gastrointestinal: Negative for nausea, vomiting, diarrhea.        OBJECTIVE:  PAST HISTORIES:  Allergies, Medications, Medical History, Surgical History, Social History and Family History were reviewed and updated.    PHYSICAL EXAM:    Vital Signs:    Visit Vitals  /76   Ht 5' 9\" (1.753 m)   Wt 90.9 kg (200 lb 6.4 oz)   LMP 2022 (Exact Date)   BMI 29.59 kg/m²     General: Alert, cooperative, conversive.  Skin:  Warm and dry without rash.    Head:  Normocephalic-atraumatic.   Neck:  No masses or thyromegaly  Breasts:  No masses, tenderness or nipple discharge bilaterally  Gastrointestinal:  Soft and nontender.   No hepatomegaly or splenomegaly.   Psychiatric:   Cooperative.  Appropriate mood & affect.  :  GYNECOLOGIC: External genitalia show no lesions.  URETHRAL MEATUS: No polyps and well supported.  LABIAL MINORA/MAJORA: Normal, no lesions, atrophy absent  SKENE'S GLANDS: No erythema present.  VAGINA:  Normal mucosa/normal discharge.  CERVIX: Normal and without  lesions or masses   UTERUS: Normal size, mobile , non-tender, midplane  ADNEXA: No masses  and no tenderness  RECTAL:  Mild external hemorrhoids.      ASSESSMENT/PLAN:  1. Gynecologic exam normal    2. General counseling and advice on female contraception         --breast and pelvic exam as noted; Pap was not done  --Pap last year was normal and continue every 3 years  --continue on  current birth control pill as she has minimal menses  --return in 1 year for gyn care

## 2024-05-06 NOTE — PATIENT INSTRUCTIONS
Frozen shoulder/ Adhesive capsulitis  Medrol dose shara sent to your pharmacy.  Order for physical therapy.   Follow up in 2 months

## 2024-05-07 ASSESSMENT — ENCOUNTER SYMPTOMS
VOMITING: 0
WHEEZING: 0
COLOR CHANGE: 0
STRIDOR: 0
FACIAL SWELLING: 0
SHORTNESS OF BREATH: 0
ABDOMINAL PAIN: 0
BACK PAIN: 0
EYE PAIN: 0
NAUSEA: 0
EYE REDNESS: 0
COUGH: 0
PHOTOPHOBIA: 0

## 2024-05-08 ENCOUNTER — TELEPHONE (OUTPATIENT)
Dept: ORTHOPEDIC SURGERY | Age: 74
End: 2024-05-08

## 2024-05-08 RX ORDER — METHYLPREDNISOLONE 4 MG/1
TABLET ORAL
Qty: 1 KIT | Refills: 0 | Status: SHIPPED | OUTPATIENT
Start: 2024-05-08 | End: 2024-05-14

## 2024-05-26 NOTE — PROGRESS NOTES
RHEUMATOLOGY NEW PATIENT VISIT    2024      Patient Name: Mary Kate Rangel  : 1950  Medical Record: [id number]      CHIEF COMPLAINT    Chronic left shoulder pain  Elevated ESR  Positive CONSTANTINO 1: 320    Pertinent Problems  HX of follicular carcinoma s/p thyroidectomy   S/p hysterectomy   S/p tonsillectomy   Hx of uveitis ~ 15 years ago    HISTORY OF PRESENT ILLNESS    Mary Kate Rangel is a 73 y.o. female who was referred by Gabrielle Bloom. Symptom onset > 10 years ago with bilateral knee pain then hand, shoulders and rib pain began. She saw ortho doctor for bilateral shoulder who recommended PT that initially helped her right shoulder. Repeat PT fairly helped her left shoulder. She is also undergoing w/u for elevated ESR with oncology. So far she takes tylenol only for joint pain.    There is left shoulder pain   Omega supplements have helped other joint pain  Overall Discomfort: 3/10   Swelling: denies  AM stiffness: in left shoulder, lasting up to an hour  Improved with: activity  Worse with: rest     Hair loss: Denies  Oral Ulcers: Denies  Dry eyes and mouth: Denies  Rashes: Denies  Photosensitivity: Denies   Photophobia: Denies  Raynaud's: Denies  Chest Pain: Denies  SOB: Denies  Blood Clots: Denies  Seizures/strokes: Denies  Kidney Disease:  denies  Anemia/thrombocytopenia/leukopenia: Denies  Antibodies: CONSTANTINO +     Denies smoking, etoh, recreational drug use. There is no family hx of lupus, RA, PsA or CTD.      Current rheum meds:  none     Past rheum meds:          No data to display                    REVIEW OF SYSTEMS     Constitutional:  Denies fever or chills, decreased appetite, or weight loss   Eyes:  Denies change in visual acuity or eye dryness or irritation  HENT:  Denies dry mouth or oral ulcers  Respiratory:  Denies cough or shortness of breath   Cardiovascular:  Denies chest pain or edema   GI:  Denies abdominal pain, nausea, vomiting, bloody stools or diarrhea   :  Denies dysuria or 
no

## 2024-05-30 ENCOUNTER — OFFICE VISIT (OUTPATIENT)
Dept: RHEUMATOLOGY | Age: 74
End: 2024-05-30
Payer: MEDICARE

## 2024-05-30 ENCOUNTER — HOSPITAL ENCOUNTER (OUTPATIENT)
Age: 74
Discharge: HOME | End: 2024-05-30
Payer: MEDICARE

## 2024-05-30 VITALS
BODY MASS INDEX: 22.04 KG/M2 | OXYGEN SATURATION: 98 % | WEIGHT: 120.5 LBS | HEART RATE: 77 BPM | SYSTOLIC BLOOD PRESSURE: 120 MMHG | DIASTOLIC BLOOD PRESSURE: 70 MMHG

## 2024-05-30 DIAGNOSIS — R70.0 ELEVATED ERYTHROCYTE SEDIMENTATION RATE: ICD-10-CM

## 2024-05-30 DIAGNOSIS — M25.50 POLYARTHRALGIA: ICD-10-CM

## 2024-05-30 DIAGNOSIS — R76.8 ANA POSITIVE: Primary | ICD-10-CM

## 2024-05-30 DIAGNOSIS — Z01.89 ENCOUNTER FOR OTHER SPECIFIED SPECIAL EXAMINATIONS: ICD-10-CM

## 2024-05-30 DIAGNOSIS — R76.8 ANA POSITIVE: ICD-10-CM

## 2024-05-30 DIAGNOSIS — M89.9 DISORDER OF BONE, UNSPECIFIED: ICD-10-CM

## 2024-05-30 LAB
25(OH)D3 SERPL-MCNC: 35.74 NG/ML
ALBUMIN SERPL-MCNC: 4.5 GM/DL (ref 3.4–5)
ALBUMIN SERPL-MCNC: 4.5 GM/DL (ref 3.4–5)
ALP BLD-CCNC: 93 IU/L (ref 40–129)
ALT SERPL-CCNC: 10 U/L (ref 10–40)
ANION GAP SERPL CALCULATED.3IONS-SCNC: 12 MMOL/L (ref 7–16)
AST SERPL-CCNC: 19 IU/L (ref 15–37)
BILIRUB SERPL-MCNC: 0.1 MG/DL (ref 0–1)
BILIRUBIN DIRECT: 0.2 MG/DL (ref 0–0.3)
BILIRUBIN, INDIRECT: ABNORMAL MG/DL (ref 0–0.7)
BUN SERPL-MCNC: 9 MG/DL (ref 6–23)
CALCIUM SERPL-MCNC: 9.1 MG/DL (ref 8.3–10.6)
CHLORIDE BLD-SCNC: 104 MMOL/L (ref 99–110)
CO2: 24 MMOL/L (ref 21–32)
CREAT SERPL-MCNC: 0.5 MG/DL (ref 0.6–1.1)
CRP SERPL HS-MCNC: 5.5 MG/L
GFR, ESTIMATED: >90 ML/MIN/1.73M2
GLUCOSE SERPL-MCNC: 93 MG/DL (ref 70–99)
HAV IGM SERPL QL IA: NON REACTIVE
HBV CORE IGM SERPL QL IA: NON REACTIVE
HBV SURFACE AG SERPL QL IA: NON REACTIVE
HCT VFR BLD CALC: 42.2 % (ref 37–47)
HCV AB SERPL QL IA: NON REACTIVE
HEMOGLOBIN: 13.2 GM/DL (ref 12.5–16)
MCH RBC QN AUTO: 30.3 PG (ref 27–31)
MCHC RBC AUTO-ENTMCNC: 31.3 % (ref 32–36)
MCV RBC AUTO: 96.8 FL (ref 78–100)
PDW BLD-RTO: 13 % (ref 11.7–14.9)
PHOSPHORUS: 3.4 MG/DL (ref 2.5–4.9)
PLATELET # BLD: 267 K/CU MM (ref 140–440)
PMV BLD AUTO: 9.4 FL (ref 7.5–11.1)
POTASSIUM SERPL-SCNC: 3.8 MMOL/L (ref 3.5–5.1)
RBC # BLD: 4.36 M/CU MM (ref 4.2–5.4)
SED RATE, AUTOMATED: 32 MM/HR (ref 0–30)
SODIUM BLD-SCNC: 140 MMOL/L (ref 135–145)
TOTAL PROTEIN: 8.6 GM/DL (ref 6.4–8.2)
WBC # BLD: 6.4 K/CU MM (ref 4–10.5)

## 2024-05-30 PROCEDURE — 86200 CCP ANTIBODY: CPT

## 2024-05-30 PROCEDURE — 80053 COMPREHEN METABOLIC PANEL: CPT

## 2024-05-30 PROCEDURE — 99205 OFFICE O/P NEW HI 60 MIN: CPT | Performed by: STUDENT IN AN ORGANIZED HEALTH CARE EDUCATION/TRAINING PROGRAM

## 2024-05-30 PROCEDURE — 1123F ACP DISCUSS/DSCN MKR DOCD: CPT | Performed by: STUDENT IN AN ORGANIZED HEALTH CARE EDUCATION/TRAINING PROGRAM

## 2024-05-30 PROCEDURE — 85652 RBC SED RATE AUTOMATED: CPT

## 2024-05-30 PROCEDURE — 1090F PRES/ABSN URINE INCON ASSESS: CPT | Performed by: STUDENT IN AN ORGANIZED HEALTH CARE EDUCATION/TRAINING PROGRAM

## 2024-05-30 PROCEDURE — G8420 CALC BMI NORM PARAMETERS: HCPCS | Performed by: STUDENT IN AN ORGANIZED HEALTH CARE EDUCATION/TRAINING PROGRAM

## 2024-05-30 PROCEDURE — G8400 PT W/DXA NO RESULTS DOC: HCPCS | Performed by: STUDENT IN AN ORGANIZED HEALTH CARE EDUCATION/TRAINING PROGRAM

## 2024-05-30 PROCEDURE — 86162 COMPLEMENT TOTAL (CH50): CPT

## 2024-05-30 PROCEDURE — 85027 COMPLETE CBC AUTOMATED: CPT

## 2024-05-30 PROCEDURE — 86430 RHEUMATOID FACTOR TEST QUAL: CPT

## 2024-05-30 PROCEDURE — 86160 COMPLEMENT ANTIGEN: CPT

## 2024-05-30 PROCEDURE — 3017F COLORECTAL CA SCREEN DOC REV: CPT | Performed by: STUDENT IN AN ORGANIZED HEALTH CARE EDUCATION/TRAINING PROGRAM

## 2024-05-30 PROCEDURE — 86140 C-REACTIVE PROTEIN: CPT

## 2024-05-30 PROCEDURE — 80074 ACUTE HEPATITIS PANEL: CPT

## 2024-05-30 PROCEDURE — 82306 VITAMIN D 25 HYDROXY: CPT

## 2024-05-30 PROCEDURE — 36415 COLL VENOUS BLD VENIPUNCTURE: CPT

## 2024-05-30 PROCEDURE — 86480 TB TEST CELL IMMUN MEASURE: CPT

## 2024-05-30 PROCEDURE — 1036F TOBACCO NON-USER: CPT | Performed by: STUDENT IN AN ORGANIZED HEALTH CARE EDUCATION/TRAINING PROGRAM

## 2024-05-30 PROCEDURE — 84100 ASSAY OF PHOSPHORUS: CPT

## 2024-05-30 PROCEDURE — 82248 BILIRUBIN DIRECT: CPT

## 2024-05-30 PROCEDURE — G8427 DOCREV CUR MEDS BY ELIG CLIN: HCPCS | Performed by: STUDENT IN AN ORGANIZED HEALTH CARE EDUCATION/TRAINING PROGRAM

## 2024-06-01 LAB
C3 SERPL-MCNC: 151 MG/DL (ref 90–180)
C4 SERPL-MCNC: 28 MG/DL (ref 10–40)
COMPLEMENT TOTAL (CH50): 65.3 U/ML (ref 38.7–89.9)
CYCLIC CITRULLINE AB IGG/IGA: 110 UNITS (ref 0–19)
RHEUMATOID FACTOR: 12 IU/ML (ref 0–14)

## 2024-06-04 ENCOUNTER — OFFICE VISIT (OUTPATIENT)
Dept: RHEUMATOLOGY | Age: 74
End: 2024-06-04
Payer: MEDICARE

## 2024-06-04 ENCOUNTER — HOSPITAL ENCOUNTER (OUTPATIENT)
Age: 74
Discharge: HOME | End: 2024-06-04
Payer: MEDICARE

## 2024-06-04 VITALS
DIASTOLIC BLOOD PRESSURE: 65 MMHG | WEIGHT: 120 LBS | SYSTOLIC BLOOD PRESSURE: 125 MMHG | OXYGEN SATURATION: 99 % | BODY MASS INDEX: 21.95 KG/M2 | HEART RATE: 77 BPM

## 2024-06-04 DIAGNOSIS — M05.79 RHEUMATOID ARTHRITIS INVOLVING MULTIPLE SITES WITH POSITIVE RHEUMATOID FACTOR (HCC): Primary | ICD-10-CM

## 2024-06-04 DIAGNOSIS — R76.8 ANA POSITIVE: ICD-10-CM

## 2024-06-04 DIAGNOSIS — Z51.81 ENCOUNTER FOR MONITORING OF HYDROXYCHLOROQUINE THERAPY: ICD-10-CM

## 2024-06-04 DIAGNOSIS — R76.12 POSITIVE QUANTIFERON-TB GOLD TEST: ICD-10-CM

## 2024-06-04 DIAGNOSIS — Z79.899 ENCOUNTER FOR MONITORING OF HYDROXYCHLOROQUINE THERAPY: ICD-10-CM

## 2024-06-04 PROCEDURE — 1123F ACP DISCUSS/DSCN MKR DOCD: CPT | Performed by: STUDENT IN AN ORGANIZED HEALTH CARE EDUCATION/TRAINING PROGRAM

## 2024-06-04 PROCEDURE — 3017F COLORECTAL CA SCREEN DOC REV: CPT | Performed by: STUDENT IN AN ORGANIZED HEALTH CARE EDUCATION/TRAINING PROGRAM

## 2024-06-04 PROCEDURE — G8400 PT W/DXA NO RESULTS DOC: HCPCS | Performed by: STUDENT IN AN ORGANIZED HEALTH CARE EDUCATION/TRAINING PROGRAM

## 2024-06-04 PROCEDURE — 1090F PRES/ABSN URINE INCON ASSESS: CPT | Performed by: STUDENT IN AN ORGANIZED HEALTH CARE EDUCATION/TRAINING PROGRAM

## 2024-06-04 PROCEDURE — 1036F TOBACCO NON-USER: CPT | Performed by: STUDENT IN AN ORGANIZED HEALTH CARE EDUCATION/TRAINING PROGRAM

## 2024-06-04 PROCEDURE — 36415 COLL VENOUS BLD VENIPUNCTURE: CPT

## 2024-06-04 PROCEDURE — 86200 CCP ANTIBODY: CPT

## 2024-06-04 PROCEDURE — G8420 CALC BMI NORM PARAMETERS: HCPCS | Performed by: STUDENT IN AN ORGANIZED HEALTH CARE EDUCATION/TRAINING PROGRAM

## 2024-06-04 PROCEDURE — G8427 DOCREV CUR MEDS BY ELIG CLIN: HCPCS | Performed by: STUDENT IN AN ORGANIZED HEALTH CARE EDUCATION/TRAINING PROGRAM

## 2024-06-04 RX ORDER — HYDROXYCHLOROQUINE SULFATE 200 MG/1
200 TABLET, FILM COATED ORAL DAILY
Qty: 90 TABLET | Refills: 0 | Status: SHIPPED | OUTPATIENT
Start: 2024-06-04

## 2024-06-04 NOTE — PATIENT INSTRUCTIONS
Start Plaquenil   Complete CONSTANTINO comprehensive test   F/u with eye doctor  RTC in 2 months    [facility]  Dr. iMguel  P: [phone]  F: [phone]    Lab Location:    [place] [address]  You must be at the lab by 3:00 p.m.   for the [facility] labs to be completed.    To Schedule DEXA Bone Scan, CT or MRI  Call at Central [phone]

## 2024-06-04 NOTE — PROGRESS NOTES
swollen glands   Psychiatric:  Denies depression or anxiety       PROBLEM LIST    Patient Active Problem List   Diagnosis    Acquired hypothyroidism, Hx C    Adhesive capsulitis of both shoulders    Allergic rhinitis    Astigmatism    Calculus of gallbladder    Carpal tunnel syndrome    Cervical rib    Chorioretinitis of right eye    Complex regional pain syndrome type I    Follicular carcinoma of thyroid gland (HCC)    HLD, Navya type IV hyperlipoproteinemia    Herniation of rectum into vagina    Herpes simplex    Hyperproteinemia    Hypokalemia    Impaired glucose tolerance (oral)    Latent tuberculosis    Osteopenia    Panuveitis    Posterior vitreous detachment    History of depression    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 positive    Vitamin D deficiency, unspecified    Xerosis cutis    Chronic left shoulder pain    Elevated erythrocyte sedimentation rate    Menopause    Dyspepsia    Elevated antinuclear antibody (CONSTANTINO) level    Adhesive capsulitis of left shoulder       MEDICATIONS    Current Outpatient Medications   Medication Sig Dispense Refill    Omega 3 340 MG CPDR Take 1 1e11 [employer] by mouth Daily with lunch      valACYclovir (VALTREX) 1 g tablet       fexofenadine (ALLEGRA) 180 MG tablet Take 1 tablet by mouth daily 90 tablet 1    Cholecalciferol (VITAMIN D-3 PO) Take by mouth      estrogens conjugated (PREMARIN) 0.625 MG/GM CREA vaginal cream Place half gram per vagina every 2-3 days. 30 g 5    levothyroxine (SYNTHROID) 88 MCG tablet 1 tablet      acetaminophen (TYLENOL) 500 MG tablet Take 1 tablet by mouth every 6 hours as needed      Multiple Vitamins-Minerals (CENTRUM SILVER 50+WOMEN PO) Centrum Silver      famotidine (PEPCID) 40 MG tablet Take 1 tablet by mouth 2 times daily 180 tablet 3    rosuvastatin (CRESTOR) 10 MG tablet Take 1 tablet by mouth daily 90 tablet 1     No current facility-administered medications for this visit.       ALLERGIES    Allergies   Allergen Reactions    Adhesive Tape

## 2024-06-07 LAB
QUANTI TB1 MINUS NIL: 1.18 IU/ML
QUANTI TB2 MINUS NIL: 1.18 IU/ML
QUANTIFERON (R) TB GOLD (INCUBATED): POSITIVE IU/ML
QUANTIFERON NIL: 0.1 IU/ML

## 2024-06-13 ENCOUNTER — TELEPHONE (OUTPATIENT)
Dept: RHEUMATOLOGY | Age: 74
End: 2024-06-13

## 2024-06-13 NOTE — TELEPHONE ENCOUNTER
----- Message from Ruma Verma MA sent at 6/13/2024 11:15 AM EDT -----    ----- Message -----  From: Gulshan Ngo MD  Sent: 6/12/2024   5:24 AM EDT  To: Ruma Verma MA    I have put in an external referral for her to infectious diseases.  They could take the referral to [facility] infectious disease.  Patient needs chest x-ray to be completed.  ----- Message -----  From: Ruma Verma MA  Sent: 6/11/2024  10:14 AM EDT  To: Gulshan Ngo MD    Pt and her  state that the pt hasn't been treated for TB. Pt and  both advised that we are unable to safely prescribe immunosuppressive medications at this time. Pt and her  verbalized understanding. They have requested that she is referred to [facility] Infectious disease. Pts  states that he will call [facility] Infectious Disease himself.

## 2024-06-26 ENCOUNTER — HOSPITAL ENCOUNTER (OUTPATIENT)
Dept: INFUSION THERAPY | Age: 74
Discharge: HOME | End: 2024-06-26
Payer: MEDICARE

## 2024-06-26 ENCOUNTER — OFFICE VISIT (OUTPATIENT)
Dept: ONCOLOGY | Age: 74
End: 2024-06-26
Payer: MEDICARE

## 2024-06-26 VITALS
TEMPERATURE: 96.9 F | OXYGEN SATURATION: 100 % | SYSTOLIC BLOOD PRESSURE: 175 MMHG | DIASTOLIC BLOOD PRESSURE: 71 MMHG | HEIGHT: 62 IN | BODY MASS INDEX: 21.83 KG/M2 | WEIGHT: 118.6 LBS | HEART RATE: 85 BPM

## 2024-06-26 DIAGNOSIS — D72.829 LEUKOCYTOSIS, UNSPECIFIED TYPE: ICD-10-CM

## 2024-06-26 DIAGNOSIS — R70.0 ELEVATED ERYTHROCYTE SEDIMENTATION RATE: ICD-10-CM

## 2024-06-26 DIAGNOSIS — C73 FOLLICULAR CARCINOMA OF THYROID GLAND (HCC): Primary | ICD-10-CM

## 2024-06-26 LAB
ALBUMIN SERPL-MCNC: 4.4 GM/DL (ref 3.4–5)
ALP BLD-CCNC: 76 IU/L (ref 40–129)
ALT SERPL-CCNC: 9 U/L (ref 10–40)
ANION GAP SERPL CALCULATED.3IONS-SCNC: 13 MMOL/L (ref 7–16)
AST SERPL-CCNC: 19 IU/L (ref 15–37)
BASOPHILS ABSOLUTE: 0.1 K/CU MM
BASOPHILS RELATIVE PERCENT: 2.2 % (ref 0–1)
BILIRUB SERPL-MCNC: 0.2 MG/DL (ref 0–1)
BUN SERPL-MCNC: 11 MG/DL (ref 6–23)
CALCIUM SERPL-MCNC: 9.5 MG/DL (ref 8.3–10.6)
CHLORIDE BLD-SCNC: 101 MMOL/L (ref 99–110)
CO2: 23 MMOL/L (ref 21–32)
CREAT SERPL-MCNC: 0.5 MG/DL (ref 0.6–1.1)
DIFFERENTIAL TYPE: ABNORMAL
EOSINOPHILS ABSOLUTE: 0.1 K/CU MM
EOSINOPHILS RELATIVE PERCENT: 1.1 % (ref 0–3)
GFR, ESTIMATED: >90 ML/MIN/1.73M2
GLUCOSE SERPL-MCNC: 98 MG/DL (ref 70–99)
HCT VFR BLD CALC: 43.2 % (ref 37–47)
HEMOGLOBIN: 13.7 GM/DL (ref 12.5–16)
IGA: 560 MG/DL (ref 69–382)
IGG,SERUM: 1809 MG/DL (ref 723–1685)
IGM,SERUM: 270 MG/DL (ref 62–277)
LACTATE DEHYDROGENASE: 188 IU/L (ref 120–246)
LYMPHOCYTES ABSOLUTE: 1.9 K/CU MM
LYMPHOCYTES RELATIVE PERCENT: 34.1 % (ref 24–44)
MCH RBC QN AUTO: 30.9 PG (ref 27–31)
MCHC RBC AUTO-ENTMCNC: 31.7 % (ref 32–36)
MCV RBC AUTO: 97.3 FL (ref 78–100)
MONOCYTES ABSOLUTE: 1.3 K/CU MM
MONOCYTES RELATIVE PERCENT: 22.8 % (ref 0–4)
NEUTROPHILS ABSOLUTE: 2.2 K/CU MM
NEUTROPHILS RELATIVE PERCENT: 39.8 % (ref 36–66)
PDW BLD-RTO: 12.6 % (ref 11.7–14.9)
PLATELET # BLD: 220 K/CU MM (ref 140–440)
PMV BLD AUTO: 9.4 FL (ref 7.5–11.1)
POTASSIUM SERPL-SCNC: 3.7 MMOL/L (ref 3.5–5.1)
RBC # BLD: 4.44 M/CU MM (ref 4.2–5.4)
SODIUM BLD-SCNC: 137 MMOL/L (ref 135–145)
TOTAL PROTEIN: 8.4 GM/DL (ref 6.4–8.2)
WBC # BLD: 5.5 K/CU MM (ref 4–10.5)

## 2024-06-26 PROCEDURE — 82784 ASSAY IGA/IGD/IGG/IGM EACH: CPT

## 2024-06-26 PROCEDURE — G8420 CALC BMI NORM PARAMETERS: HCPCS | Performed by: INTERNAL MEDICINE

## 2024-06-26 PROCEDURE — 80053 COMPREHEN METABOLIC PANEL: CPT

## 2024-06-26 PROCEDURE — 84155 ASSAY OF PROTEIN SERUM: CPT

## 2024-06-26 PROCEDURE — 84165 PROTEIN E-PHORESIS SERUM: CPT

## 2024-06-26 PROCEDURE — G8400 PT W/DXA NO RESULTS DOC: HCPCS | Performed by: INTERNAL MEDICINE

## 2024-06-26 PROCEDURE — 1036F TOBACCO NON-USER: CPT | Performed by: INTERNAL MEDICINE

## 2024-06-26 PROCEDURE — 99211 OFF/OP EST MAY X REQ PHY/QHP: CPT

## 2024-06-26 PROCEDURE — G8427 DOCREV CUR MEDS BY ELIG CLIN: HCPCS | Performed by: INTERNAL MEDICINE

## 2024-06-26 PROCEDURE — 99214 OFFICE O/P EST MOD 30 MIN: CPT | Performed by: INTERNAL MEDICINE

## 2024-06-26 PROCEDURE — 83615 LACTATE (LD) (LDH) ENZYME: CPT

## 2024-06-26 PROCEDURE — 85025 COMPLETE CBC W/AUTO DIFF WBC: CPT

## 2024-06-26 PROCEDURE — 36415 COLL VENOUS BLD VENIPUNCTURE: CPT

## 2024-06-26 PROCEDURE — 1090F PRES/ABSN URINE INCON ASSESS: CPT | Performed by: INTERNAL MEDICINE

## 2024-06-26 PROCEDURE — 1123F ACP DISCUSS/DSCN MKR DOCD: CPT | Performed by: INTERNAL MEDICINE

## 2024-06-26 PROCEDURE — 86320 SERUM IMMUNOELECTROPHORESIS: CPT

## 2024-06-26 PROCEDURE — 82232 ASSAY OF BETA-2 PROTEIN: CPT

## 2024-06-26 PROCEDURE — 83883 ASSAY NEPHELOMETRY NOT SPEC: CPT

## 2024-06-26 PROCEDURE — 3017F COLORECTAL CA SCREEN DOC REV: CPT | Performed by: INTERNAL MEDICINE

## 2024-06-26 NOTE — PROGRESS NOTES
MA Rooming Questions  Patient: Mary Kate Rangel  MRN: [id number]    Date: 6/26/2024        1. Do you have any new issues?   no         2. Do you need any refills on medications?    no    3. Have you had any imaging done since your last visit?   no    4. Have you been hospitalized or seen in the emergency room since your last visit here?   no    5. Did the patient have a depression screening completed today? No    No data recorded     PHQ-9 Given to (if applicable):               PHQ-9 Score (if applicable):                     [] Positive     []  Negative              Does question #9 need addressed (if applicable)                     [] Yes    []  No               Talya Escudero CMA    
today's visit, > 30  mins.     NASEEM

## 2024-06-28 LAB
ALBUMIN SERPL ELPH-MCNC: 4.1 GM/DL (ref 3.2–5.6)
ALPHA-1-GLOBULIN: 0.3 GM/DL (ref 0.1–0.4)
ALPHA-2-GLOBULIN: 0.8 GM/DL (ref 0.4–1.2)
B2 MICROGLOB SERPL-MCNC: 1.7 MG/L
BETA GLOBULIN: 1.2 GM/DL (ref 0.5–1.3)
GAMMA GLOBULIN: 2.1 GM/DL (ref 0.5–1.6)
KAPPA LC FREE SER-MCNC: 28.92 MG/L (ref 3.3–19.4)
KAPPA LC FREE/LAMBDA FREE SER NEPH: 1.23 {RATIO} (ref 0.26–1.65)
LAMBDA LC FREE SERPL-MCNC: 23.57 MG/L (ref 5.71–26.3)
SPEP INTERPRETATION: ABNORMAL
SPEP INTERPRETATION: NORMAL
TOTAL PROTEIN: 8.4 GM/DL (ref 6.4–8.2)

## 2024-07-01 ENCOUNTER — HOSPITAL ENCOUNTER (OUTPATIENT)
Age: 74
Setting detail: SPECIMEN
Discharge: HOME | End: 2024-07-01
Payer: MEDICARE

## 2024-07-01 PROCEDURE — 86335 IMMUNFIX E-PHORSIS/URINE/CSF: CPT

## 2024-07-01 PROCEDURE — 83520 IMMUNOASSAY QUANT NOS NONAB: CPT

## 2024-07-01 PROCEDURE — 81050 URINALYSIS VOLUME MEASURE: CPT

## 2024-07-01 PROCEDURE — 84156 ASSAY OF PROTEIN URINE: CPT

## 2024-07-01 PROCEDURE — 84166 PROTEIN E-PHORESIS/URINE/CSF: CPT

## 2024-07-02 LAB
Lab: 21 HRS
Lab: 24 HRS
PROTEIN ELP 24 HOUR URINE: 74 MG/24 HR
VOLUME, (UVOL): 1400 MLS
VOLUME, (UVOL): 1425 MLS

## 2024-07-05 LAB
COLLECT DURATION TIME SPEC: NORMAL H
INTERPRETATION: NORMAL
KAPPA LC FREE 24H UR-MRATE: NORMAL MG/D
KAPPA LC FREE UR-MCNC: 13.53 MG/L (ref 0–32.9)
LAMBDA LC FREE 24H UR-MRATE: NORMAL MG/D
LAMBDA LC FREE UR-MCNC: 2.15 MG/L (ref 0–3.79)
PROT 24H UR-MRATE: NORMAL MG/D
SPECIMEN VOL ?TM UR: NORMAL ML

## 2024-07-08 ENCOUNTER — OFFICE VISIT (OUTPATIENT)
Dept: ORTHOPEDIC SURGERY | Age: 74
End: 2024-07-08
Payer: MEDICARE

## 2024-07-08 VITALS — WEIGHT: 117 LBS | HEIGHT: 62 IN | OXYGEN SATURATION: 99 % | BODY MASS INDEX: 21.53 KG/M2 | HEART RATE: 86 BPM

## 2024-07-08 DIAGNOSIS — M75.02 ADHESIVE CAPSULITIS OF LEFT SHOULDER: Primary | ICD-10-CM

## 2024-07-08 LAB
ALBUMIN, U: 100 %
ALPHA-1-GLOBULIN, U: 0 %
ALPHA-2-GLOBULIN, U: 0 %
BETA GLOBULIN, U: 0 %
GAMMA GLOBULIN, U: 0 %
PROTEIN ELP 24 HOUR URINE: 74 MG/24 HR
SPEP INTERPRETATION: NORMAL

## 2024-07-08 PROCEDURE — 99213 OFFICE O/P EST LOW 20 MIN: CPT | Performed by: STUDENT IN AN ORGANIZED HEALTH CARE EDUCATION/TRAINING PROGRAM

## 2024-07-08 PROCEDURE — 1036F TOBACCO NON-USER: CPT | Performed by: STUDENT IN AN ORGANIZED HEALTH CARE EDUCATION/TRAINING PROGRAM

## 2024-07-08 PROCEDURE — 3017F COLORECTAL CA SCREEN DOC REV: CPT | Performed by: STUDENT IN AN ORGANIZED HEALTH CARE EDUCATION/TRAINING PROGRAM

## 2024-07-08 PROCEDURE — G8427 DOCREV CUR MEDS BY ELIG CLIN: HCPCS | Performed by: STUDENT IN AN ORGANIZED HEALTH CARE EDUCATION/TRAINING PROGRAM

## 2024-07-08 PROCEDURE — G8400 PT W/DXA NO RESULTS DOC: HCPCS | Performed by: STUDENT IN AN ORGANIZED HEALTH CARE EDUCATION/TRAINING PROGRAM

## 2024-07-08 PROCEDURE — 1090F PRES/ABSN URINE INCON ASSESS: CPT | Performed by: STUDENT IN AN ORGANIZED HEALTH CARE EDUCATION/TRAINING PROGRAM

## 2024-07-08 PROCEDURE — G8420 CALC BMI NORM PARAMETERS: HCPCS | Performed by: STUDENT IN AN ORGANIZED HEALTH CARE EDUCATION/TRAINING PROGRAM

## 2024-07-08 PROCEDURE — 1123F ACP DISCUSS/DSCN MKR DOCD: CPT | Performed by: STUDENT IN AN ORGANIZED HEALTH CARE EDUCATION/TRAINING PROGRAM

## 2024-07-08 ASSESSMENT — ENCOUNTER SYMPTOMS
EYE REDNESS: 0
EYE PAIN: 0
COUGH: 0
WHEEZING: 0
SHORTNESS OF BREATH: 0
COLOR CHANGE: 0
BACK PAIN: 0
PHOTOPHOBIA: 0
NAUSEA: 0
FACIAL SWELLING: 0
VOMITING: 0
ABDOMINAL PAIN: 0
STRIDOR: 0

## 2024-07-08 NOTE — PROGRESS NOTES
7/8/2024   No chief complaint on file.     Updated HPI: Patient returns today for reevaluation of her left shoulder.  Patient states she is doing much better.  She is able to do all ADLs without significant issue.  She has been doing physical therapy formally as well as exercises on her own and she states that these been very helpful.  She has a recent diagnosis of rheumatoid arthritis and she is currently being treated for this by an outside physician.  No new injuries or complaints.  Again, states her range of motion is greatly improved since her last visit.    Previous HPI (5/6/2024):                             Mary Kate Rangel is a 73 y.o. female right handed patient referred by PCP for evaluation and treatment left shoulder pain.  Patient denies any specific injury.  She states that her pain in the left shoulder has been progressive slowly over the last year.  She does have a history of right shoulder adhesive capsulitis the previous year that resolved with Medrol Dosepak as well as physical therapy.  She states that the left shoulder feels very similar to what the right shoulder felt like in the past.  She has had no intervention thus far.  She was seen in the emergency room at [facility] and was told that she had arthritis and was given exercises to do which she has been doing.  She states that no significant change in her range of motion or pain.  This is my first time seeing the patient.  No advanced imaging thus far.    Patient currently retired    No prior shoulder injections or surgery on the left.  No prior significant injury on the left.    The pain occurs every day and when active. Location of pain is diffusely throughout shoulder, mostly at the anterior joint line. No history of shoulder separation or dislocation. Symptoms are aggravated by ADL's, repetitive use, work at or above shoulder height. Symptoms are diminished by rest, avoiding the painful activities.     Limited activities include: lifting,

## 2024-07-08 NOTE — PROGRESS NOTES
Pt comes in today for a 2 month follow up s/p LT shoulder adhesive capsulitis. Pain is 2/10 today. She states that she has been diagnosed  with rheumatoid arthritis since her last visit. She completed PT and is doing well.

## 2024-07-18 DIAGNOSIS — R70.0 ELEVATED ERYTHROCYTE SEDIMENTATION RATE: Primary | ICD-10-CM

## 2024-07-26 ENCOUNTER — TELEPHONE (OUTPATIENT)
Dept: ONCOLOGY | Age: 74
End: 2024-07-26

## 2024-07-26 NOTE — TELEPHONE ENCOUNTER
----- Message from Gabrielle Bloom MD sent at 7/18/2024  5:17 PM EDT -----  Labs jan 6 and apt 2 weeks later

## 2024-07-26 NOTE — TELEPHONE ENCOUNTER
7/26/24 - Dr Bloom wants to see the pt back on 1/6/25 for labs and a follow up 2 weeks later. I called the pt to schedule and she stated she was having eye surgery and she wanted to call back at a later date to schedule the appts. I gave her my name and direct line to call back.

## 2024-08-02 NOTE — PROGRESS NOTES
irritation  HENT:  Denies dry mouth or oral ulcers  Respiratory:  Denies cough or shortness of breath   Cardiovascular:  Denies chest pain or edema   GI:  Denies abdominal pain, nausea, vomiting, bloody stools or diarrhea   :  Denies dysuria or hematuria  Musculoskeletal:  See HPI  Integument:  Denies rash   Neurologic:  Denies headache, focal weakness or sensory changes   Endocrine:  Denies polyuria or polydipsia   Lymphatic:  Denies swollen glands   Psychiatric:  Denies depression or anxiety       PROBLEM LIST    Patient Active Problem List   Diagnosis    Acquired hypothyroidism, Hx C    Adhesive capsulitis of both shoulders    Allergic rhinitis    Astigmatism    Calculus of gallbladder    Carpal tunnel syndrome    Cervical rib    Chorioretinitis of right eye    Complex regional pain syndrome type I    Follicular carcinoma of thyroid gland (HCC)    HLD, Navya type IV hyperlipoproteinemia    Herniation of rectum into vagina    Herpes simplex    Hyperproteinemia    Hypokalemia    Impaired glucose tolerance (oral)    Latent tuberculosis    Osteopenia    Panuveitis    Posterior vitreous detachment    History of depression    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 positive    Vitamin D deficiency, unspecified    Xerosis cutis    Chronic left shoulder pain    Elevated erythrocyte sedimentation rate    Menopause    Dyspepsia    Elevated antinuclear antibody (CONSTANTINO) level    Adhesive capsulitis of left shoulder       MEDICATIONS    Current Outpatient Medications   Medication Sig Dispense Refill    hydroxychloroquine (PLAQUENIL) 200 MG tablet Take 1 tablet by mouth daily 90 tablet 0    Omega 3 340 MG CPDR Take 1 1e11 [employer] by mouth Daily with lunch      estrogens conjugated (PREMARIN) 0.625 MG/GM CREA vaginal cream Place half gram per vagina every 2-3 days. 30 g 5    levothyroxine (SYNTHROID) 88 MCG tablet 1 tablet      acetaminophen (TYLENOL) 500 MG tablet Take 1 tablet by mouth every 6 hours as needed      Multiple

## 2024-08-06 ENCOUNTER — OFFICE VISIT (OUTPATIENT)
Dept: RHEUMATOLOGY | Age: 74
End: 2024-08-06
Payer: MEDICARE

## 2024-08-06 VITALS
BODY MASS INDEX: 22.45 KG/M2 | RESPIRATION RATE: 16 BRPM | DIASTOLIC BLOOD PRESSURE: 68 MMHG | OXYGEN SATURATION: 99 % | HEART RATE: 74 BPM | HEIGHT: 62 IN | SYSTOLIC BLOOD PRESSURE: 116 MMHG | WEIGHT: 122 LBS

## 2024-08-06 DIAGNOSIS — R76.8 ANA POSITIVE: ICD-10-CM

## 2024-08-06 DIAGNOSIS — M05.79 RHEUMATOID ARTHRITIS INVOLVING MULTIPLE SITES WITH POSITIVE RHEUMATOID FACTOR (HCC): Primary | ICD-10-CM

## 2024-08-06 DIAGNOSIS — Z51.81 ENCOUNTER FOR MONITORING OF HYDROXYCHLOROQUINE THERAPY: ICD-10-CM

## 2024-08-06 DIAGNOSIS — Z79.899 ENCOUNTER FOR MONITORING OF HYDROXYCHLOROQUINE THERAPY: ICD-10-CM

## 2024-08-06 DIAGNOSIS — R79.82 ELEVATED C-REACTIVE PROTEIN (CRP): ICD-10-CM

## 2024-08-06 PROCEDURE — 1036F TOBACCO NON-USER: CPT | Performed by: STUDENT IN AN ORGANIZED HEALTH CARE EDUCATION/TRAINING PROGRAM

## 2024-08-06 PROCEDURE — 3017F COLORECTAL CA SCREEN DOC REV: CPT | Performed by: STUDENT IN AN ORGANIZED HEALTH CARE EDUCATION/TRAINING PROGRAM

## 2024-08-06 PROCEDURE — 1123F ACP DISCUSS/DSCN MKR DOCD: CPT | Performed by: STUDENT IN AN ORGANIZED HEALTH CARE EDUCATION/TRAINING PROGRAM

## 2024-08-06 PROCEDURE — G8420 CALC BMI NORM PARAMETERS: HCPCS | Performed by: STUDENT IN AN ORGANIZED HEALTH CARE EDUCATION/TRAINING PROGRAM

## 2024-08-06 PROCEDURE — 1090F PRES/ABSN URINE INCON ASSESS: CPT | Performed by: STUDENT IN AN ORGANIZED HEALTH CARE EDUCATION/TRAINING PROGRAM

## 2024-08-06 PROCEDURE — G8427 DOCREV CUR MEDS BY ELIG CLIN: HCPCS | Performed by: STUDENT IN AN ORGANIZED HEALTH CARE EDUCATION/TRAINING PROGRAM

## 2024-08-06 PROCEDURE — 99215 OFFICE O/P EST HI 40 MIN: CPT | Performed by: STUDENT IN AN ORGANIZED HEALTH CARE EDUCATION/TRAINING PROGRAM

## 2024-08-06 PROCEDURE — G8400 PT W/DXA NO RESULTS DOC: HCPCS | Performed by: STUDENT IN AN ORGANIZED HEALTH CARE EDUCATION/TRAINING PROGRAM

## 2024-08-06 RX ORDER — OFLOXACIN 3 MG/ML
4 SOLUTION/ DROPS OPHTHALMIC 4 TIMES DAILY
COMMUNITY
Start: 2024-07-22

## 2024-08-06 RX ORDER — PREDNISOLONE ACETATE 10 MG/ML
2 SUSPENSION/ DROPS OPHTHALMIC 3 TIMES DAILY
COMMUNITY
Start: 2024-07-22

## 2024-08-07 LAB — C REACTIVE PROTEIN (CRP), BODY FLUID: 0.63 MG/DL

## 2024-08-29 LAB — COMMENT: NORMAL

## 2024-10-22 ENCOUNTER — OFFICE VISIT (OUTPATIENT)
Dept: FAMILY MEDICINE CLINIC | Age: 74
End: 2024-10-22
Payer: MEDICARE

## 2024-10-22 VITALS
WEIGHT: 126.8 LBS | SYSTOLIC BLOOD PRESSURE: 122 MMHG | OXYGEN SATURATION: 99 % | HEART RATE: 72 BPM | DIASTOLIC BLOOD PRESSURE: 72 MMHG | BODY MASS INDEX: 23.34 KG/M2 | HEIGHT: 62 IN | TEMPERATURE: 98.2 F

## 2024-10-22 DIAGNOSIS — E88.09 HYPERPROTEINEMIA: ICD-10-CM

## 2024-10-22 DIAGNOSIS — M05.79 RHEUMATOID ARTHRITIS INVOLVING MULTIPLE SITES WITH POSITIVE RHEUMATOID FACTOR (HCC): Primary | ICD-10-CM

## 2024-10-22 DIAGNOSIS — R76.8 ANA POSITIVE: ICD-10-CM

## 2024-10-22 DIAGNOSIS — E78.1: ICD-10-CM

## 2024-10-22 DIAGNOSIS — Z23 IMMUNIZATION DUE: ICD-10-CM

## 2024-10-22 PROCEDURE — 1090F PRES/ABSN URINE INCON ASSESS: CPT | Performed by: FAMILY MEDICINE

## 2024-10-22 PROCEDURE — 3017F COLORECTAL CA SCREEN DOC REV: CPT | Performed by: FAMILY MEDICINE

## 2024-10-22 PROCEDURE — G8427 DOCREV CUR MEDS BY ELIG CLIN: HCPCS | Performed by: FAMILY MEDICINE

## 2024-10-22 PROCEDURE — G8420 CALC BMI NORM PARAMETERS: HCPCS | Performed by: FAMILY MEDICINE

## 2024-10-22 PROCEDURE — 1123F ACP DISCUSS/DSCN MKR DOCD: CPT | Performed by: FAMILY MEDICINE

## 2024-10-22 PROCEDURE — G8400 PT W/DXA NO RESULTS DOC: HCPCS | Performed by: FAMILY MEDICINE

## 2024-10-22 PROCEDURE — G8484 FLU IMMUNIZE NO ADMIN: HCPCS | Performed by: FAMILY MEDICINE

## 2024-10-22 PROCEDURE — 1036F TOBACCO NON-USER: CPT | Performed by: FAMILY MEDICINE

## 2024-10-22 PROCEDURE — 36415 COLL VENOUS BLD VENIPUNCTURE: CPT | Performed by: FAMILY MEDICINE

## 2024-10-22 PROCEDURE — 1159F MED LIST DOCD IN RCRD: CPT | Performed by: FAMILY MEDICINE

## 2024-10-22 PROCEDURE — 99214 OFFICE O/P EST MOD 30 MIN: CPT | Performed by: FAMILY MEDICINE

## 2024-10-22 PROCEDURE — G0009 ADMIN PNEUMOCOCCAL VACCINE: HCPCS | Performed by: FAMILY MEDICINE

## 2024-10-22 PROCEDURE — 90677 PCV20 VACCINE IM: CPT | Performed by: FAMILY MEDICINE

## 2024-10-22 RX ORDER — ROSUVASTATIN CALCIUM 10 MG/1
10 TABLET, COATED ORAL DAILY
Qty: 90 TABLET | Refills: 1 | Status: SHIPPED | OUTPATIENT
Start: 2024-10-22

## 2024-10-22 RX ORDER — HYDROXYCHLOROQUINE SULFATE 200 MG/1
200 TABLET, FILM COATED ORAL DAILY
Qty: 90 TABLET | Refills: 0 | Status: SHIPPED | OUTPATIENT
Start: 2024-10-22

## 2024-10-22 ASSESSMENT — ENCOUNTER SYMPTOMS
CONSTIPATION: 0
ABDOMINAL PAIN: 0

## 2024-10-22 NOTE — PROGRESS NOTES
10/22/24    Mary Kate Rangel  1950    SUBJECTIVE    EMMA Hartmann is a 74 y.o. female who presents today for evaluation of:  Chief Complaint   Patient presents with    6 Month Follow-Up     Shoulder pain      RA and shoulder pain :shoulder pain , no injury. She has not taken Plaquenil x 1 month. Sometimes has stress with fight with . Sometimes L neck hurts. Does not like ibuprofen. She has recently seen eye doctor.     HLD : only takes omega 3.     Review of Systems   Gastrointestinal:  Negative for abdominal pain and constipation.   Musculoskeletal:  Positive for arthralgias.         Allergies   Allergen Reactions    Adhesive Tape Rash    Iodine Rash     Other reaction(s): Rash    PER PATIENT THIS WAS REACTION TO IODINE SWAB AND LEFT SMALL SKIN DISTURBANCE. NO PRIOR HX OF        OBJECTIVE    /72 (Site: Left Upper Arm, Position: Sitting, Cuff Size: Medium Adult)   Pulse 72   Temp 98.2 °F (36.8 °C) (Infrared)   Ht 1.575 m (5' 2\")   Wt 57.5 kg (126 lb 12.8 oz)   SpO2 99%   BMI 23.19 kg/m²     Physical Exam   Constitutional:       General: Not in acute distress.     Appearance: Normal appearance. Not ill-appearing.   Eyes:      General: No scleral icterus.  Cardiovascular:      Rate and Rhythm: Normal rate and regular rhythm.      Heart sounds: No murmur heard.   No friction rub. No gallop.    Pulmonary:      Effort: Pulmonary effort is normal. No respiratory distress.      Breath sounds: No wheezing, rhonchi or rales.   Abdominal:      Palpations: Abdomen is soft. There is no mass.      Tenderness: There is no abdominal tenderness.   Musculoskeletal:     Moves all extremities normally.    Skin:     General: Skin is warm.      Coloration: Skin is not jaundiced.   Neurological:      Mental Status: Patient is alert.   Psychiatric:         Behavior: Behavior normal.         Thought Content: Thought content normal.         Judgment: Judgment normal.      Calculated 10-year cardiovascular risk is slightly

## 2024-10-22 NOTE — ASSESSMENT & PLAN NOTE
Using a standard heart risk calculator her estimated risk is slightly more than 10%.  Given that she also has rheumatoid arthritis she has additional risk so I recommended that she take a statin.

## 2024-10-22 NOTE — ASSESSMENT & PLAN NOTE
She has rheumatoid arthritis that is not controlled because she has run out of the Plaquenil for a month.  She is having shoulder pain.  Encouraged her to follow-up with her rheumatologist.  I will prescribe Plaquenil for the upcoming 90 days.

## 2024-10-23 LAB
CHOLEST SERPL-MCNC: 234 MG/DL (ref 0–199)
HDLC SERPL-MCNC: 63 MG/DL (ref 40–60)
LDLC SERPL CALC-MCNC: 112 MG/DL
TRIGL SERPL-MCNC: 293 MG/DL (ref 0–150)
VLDLC SERPL CALC-MCNC: 59 MG/DL

## 2024-10-30 ENCOUNTER — OFFICE VISIT (OUTPATIENT)
Dept: FAMILY MEDICINE CLINIC | Age: 74
End: 2024-10-30

## 2024-10-30 VITALS
BODY MASS INDEX: 23.16 KG/M2 | DIASTOLIC BLOOD PRESSURE: 76 MMHG | TEMPERATURE: 97.4 F | OXYGEN SATURATION: 98 % | HEART RATE: 99 BPM | SYSTOLIC BLOOD PRESSURE: 134 MMHG | WEIGHT: 126.6 LBS

## 2024-10-30 DIAGNOSIS — R10.13 DYSPEPSIA: ICD-10-CM

## 2024-10-30 DIAGNOSIS — J30.2 SEASONAL ALLERGIC RHINITIS, UNSPECIFIED TRIGGER: Primary | ICD-10-CM

## 2024-10-30 DIAGNOSIS — E78.1: ICD-10-CM

## 2024-10-30 RX ORDER — BENZONATATE 100 MG/1
100-200 CAPSULE ORAL 3 TIMES DAILY PRN
Qty: 60 CAPSULE | Refills: 0 | Status: SHIPPED | OUTPATIENT
Start: 2024-10-30 | End: 2024-11-09

## 2024-10-30 RX ORDER — LORATADINE 10 MG/1
10 TABLET ORAL DAILY
Qty: 30 TABLET | Refills: 4 | Status: SHIPPED | OUTPATIENT
Start: 2024-10-30

## 2024-10-30 RX ORDER — FAMOTIDINE 40 MG/1
40 TABLET, FILM COATED ORAL 2 TIMES DAILY
Qty: 180 TABLET | Refills: 3 | Status: SHIPPED | OUTPATIENT
Start: 2024-10-30

## 2024-10-30 RX ORDER — FLUTICASONE PROPIONATE 50 MCG
2 SPRAY, SUSPENSION (ML) NASAL DAILY
Qty: 48 G | Refills: 1 | Status: SHIPPED | OUTPATIENT
Start: 2024-10-30

## 2024-10-30 ASSESSMENT — ENCOUNTER SYMPTOMS
COUGH: 1
WHEEZING: 0
ABDOMINAL PAIN: 0
VOMITING: 0
VOICE CHANGE: 1
RHINORRHEA: 1
NAUSEA: 0

## 2024-10-30 NOTE — PROGRESS NOTES
10/30/24    Mary Kate Rangel  1950    WHIT Hartmann is a 74 y.o. female who presents today for evaluation of:  Chief Complaint   Patient presents with    Eye Problem     Watery eyes    Pharyngitis     Itchy throat    Cough     All onset 10/24     Nasal Congestion     Runny nose      Runny nose and cough and itchy throat and sneezing. It was terrible last week. Sometimes non-stop cough. No wheeze in lungs.     Review of Systems   Constitutional:  Negative for fever.   HENT:  Positive for ear pain, rhinorrhea and voice change.    Respiratory:  Positive for cough. Negative for wheezing.    Cardiovascular:  Negative for chest pain and palpitations.   Gastrointestinal:  Negative for abdominal pain, nausea and vomiting.         Allergies   Allergen Reactions    Adhesive Tape Rash    Iodine Rash     Other reaction(s): Rash    PER PATIENT THIS WAS REACTION TO IODINE SWAB AND LEFT SMALL SKIN DISTURBANCE. NO PRIOR HX OF        OBJECTIVE    /76 (Site: Left Upper Arm, Position: Sitting, Cuff Size: Medium Adult)   Pulse 99   Temp 97.4 °F (36.3 °C) (Infrared)   Wt 57.4 kg (126 lb 9.6 oz)   SpO2 98%   BMI 23.16 kg/m²     Physical Exam   Constitutional:       General: Not in acute distress.     Appearance: Normal appearance. Not ill-appearing.   Eyes:      General: No scleral icterus.  .rfenCardiovascular:      Rate and Rhythm: Normal rate and regular rhythm.      Heart sounds: No murmur heard.   No friction rub. No gallop.    Pulmonary:      Effort: Pulmonary effort is normal. No respiratory distress.      Breath sounds: No wheezing, rhonchi or rales.   Abdominal:      Palpations: Abdomen is soft. There is no mass.      Tenderness: There is no abdominal tenderness.   Musculoskeletal:     Moves all extremities normally.    Skin:     General: Skin is warm.      Coloration: Skin is not jaundiced.   Neurological:      Mental Status: Patient is alert.   Psychiatric:         Behavior: Behavior normal.

## 2024-10-30 NOTE — ASSESSMENT & PLAN NOTE
I still think the most likely reason for her nasal mucus is seasonal allergic rhinitis. I will prescibed loratadine and benzonatate for allergies and cough.

## 2024-11-09 NOTE — PROGRESS NOTES
RHEUMATOLOGY FOLLOW-UP VISIT    2024      Patient Name: Mary Kate Rangel  : 1950  Medical Record: [id number]      CHIEF COMPLAINT  Seropositive rheumatoid arthritis  Chronic left shoulder pain  Elevated ESR  Positive CONSTANTINO 1: 320    Pertinent Problems  HX of follicular carcinoma s/p thyroidectomy   S/p hysterectomy   S/p tonsillectomy   Hx of uveitis ~ 15 years ago  Latent TB: not requiring treatment at this time given duration of stay in [place]. She has been cleared by ID ( Dr Nieto)     HISTORY OF PRESENT ILLNESS    Mary Kate Rangel is a 74 y.o. female who established on 2024. Symptom onset > 10 years ago with bilateral knee pain then hand, shoulders and rib pain began. She saw ortho doctor for bilateral shoulder who recommended PT that initially helped her right shoulder. Repeat PT fairly helped her left shoulder. She is also undergoing w/u for elevated ESR with oncology. So far she takes tylenol only for joint pain.    LCV: 24   positive  Creatinine normal  LFT normal  ESR 32 H  CRP 5.5H>>0.6 normal   RF normal  CONSTANTINO 1: 160 speckled  Other subserologies neg   WBC/Hb/platelets normal  Plaquenil was started on 2024   Plaquenil eye exam performed on 7/3/2024 showed no Plaquenil retinopathy or maculopathy observed at this time.  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

## 2024-11-12 ENCOUNTER — OFFICE VISIT (OUTPATIENT)
Dept: RHEUMATOLOGY | Age: 74
End: 2024-11-12
Payer: MEDICARE

## 2024-11-12 VITALS
WEIGHT: 126.2 LBS | RESPIRATION RATE: 16 BRPM | DIASTOLIC BLOOD PRESSURE: 72 MMHG | BODY MASS INDEX: 23.22 KG/M2 | OXYGEN SATURATION: 98 % | HEIGHT: 62 IN | HEART RATE: 70 BPM | SYSTOLIC BLOOD PRESSURE: 118 MMHG

## 2024-11-12 DIAGNOSIS — Z79.899 ENCOUNTER FOR MONITORING OF HYDROXYCHLOROQUINE THERAPY: ICD-10-CM

## 2024-11-12 DIAGNOSIS — M05.79 RHEUMATOID ARTHRITIS INVOLVING MULTIPLE SITES WITH POSITIVE RHEUMATOID FACTOR (HCC): Primary | ICD-10-CM

## 2024-11-12 DIAGNOSIS — R76.8 ANA POSITIVE: ICD-10-CM

## 2024-11-12 DIAGNOSIS — Z51.81 ENCOUNTER FOR MONITORING OF HYDROXYCHLOROQUINE THERAPY: ICD-10-CM

## 2024-11-12 PROCEDURE — G8420 CALC BMI NORM PARAMETERS: HCPCS | Performed by: STUDENT IN AN ORGANIZED HEALTH CARE EDUCATION/TRAINING PROGRAM

## 2024-11-12 PROCEDURE — 3017F COLORECTAL CA SCREEN DOC REV: CPT | Performed by: STUDENT IN AN ORGANIZED HEALTH CARE EDUCATION/TRAINING PROGRAM

## 2024-11-12 PROCEDURE — G8484 FLU IMMUNIZE NO ADMIN: HCPCS | Performed by: STUDENT IN AN ORGANIZED HEALTH CARE EDUCATION/TRAINING PROGRAM

## 2024-11-12 PROCEDURE — 1125F AMNT PAIN NOTED PAIN PRSNT: CPT | Performed by: STUDENT IN AN ORGANIZED HEALTH CARE EDUCATION/TRAINING PROGRAM

## 2024-11-12 PROCEDURE — G8427 DOCREV CUR MEDS BY ELIG CLIN: HCPCS | Performed by: STUDENT IN AN ORGANIZED HEALTH CARE EDUCATION/TRAINING PROGRAM

## 2024-11-12 PROCEDURE — G8400 PT W/DXA NO RESULTS DOC: HCPCS | Performed by: STUDENT IN AN ORGANIZED HEALTH CARE EDUCATION/TRAINING PROGRAM

## 2024-11-12 PROCEDURE — 99215 OFFICE O/P EST HI 40 MIN: CPT | Performed by: STUDENT IN AN ORGANIZED HEALTH CARE EDUCATION/TRAINING PROGRAM

## 2024-11-12 PROCEDURE — 1159F MED LIST DOCD IN RCRD: CPT | Performed by: STUDENT IN AN ORGANIZED HEALTH CARE EDUCATION/TRAINING PROGRAM

## 2024-11-12 PROCEDURE — 1036F TOBACCO NON-USER: CPT | Performed by: STUDENT IN AN ORGANIZED HEALTH CARE EDUCATION/TRAINING PROGRAM

## 2024-11-12 PROCEDURE — 1090F PRES/ABSN URINE INCON ASSESS: CPT | Performed by: STUDENT IN AN ORGANIZED HEALTH CARE EDUCATION/TRAINING PROGRAM

## 2024-11-12 PROCEDURE — 1123F ACP DISCUSS/DSCN MKR DOCD: CPT | Performed by: STUDENT IN AN ORGANIZED HEALTH CARE EDUCATION/TRAINING PROGRAM

## 2024-12-10 DIAGNOSIS — Z78.0 MENOPAUSE: ICD-10-CM

## 2024-12-10 RX ORDER — CONJUGATED ESTROGENS 0.62 MG/G
CREAM VAGINAL
Qty: 30 G | Refills: 5 | Status: SHIPPED | OUTPATIENT
Start: 2024-12-10

## 2025-02-03 LAB — MAMMOGRAPHY, EXTERNAL: NORMAL

## 2025-02-07 ENCOUNTER — OFFICE VISIT (OUTPATIENT)
Dept: FAMILY MEDICINE CLINIC | Age: 75
End: 2025-02-07
Payer: MEDICARE

## 2025-02-07 VITALS
HEART RATE: 88 BPM | DIASTOLIC BLOOD PRESSURE: 76 MMHG | WEIGHT: 124.8 LBS | SYSTOLIC BLOOD PRESSURE: 130 MMHG | TEMPERATURE: 97.7 F | OXYGEN SATURATION: 99 % | BODY MASS INDEX: 22.83 KG/M2

## 2025-02-07 DIAGNOSIS — E03.9 ACQUIRED HYPOTHYROIDISM: Chronic | ICD-10-CM

## 2025-02-07 DIAGNOSIS — E78.1: ICD-10-CM

## 2025-02-07 DIAGNOSIS — R73.02 IMPAIRED GLUCOSE TOLERANCE (ORAL): ICD-10-CM

## 2025-02-07 DIAGNOSIS — K80.20 CALCULUS OF GALLBLADDER WITHOUT CHOLECYSTITIS WITHOUT OBSTRUCTION: ICD-10-CM

## 2025-02-07 DIAGNOSIS — J06.9 VIRAL URI: Primary | ICD-10-CM

## 2025-02-07 DIAGNOSIS — E88.09 HYPERPROTEINEMIA: ICD-10-CM

## 2025-02-07 DIAGNOSIS — M05.79 RHEUMATOID ARTHRITIS INVOLVING MULTIPLE SITES WITH POSITIVE RHEUMATOID FACTOR (HCC): ICD-10-CM

## 2025-02-07 PROBLEM — H31.012 MACULAR SCAR, LEFT: Status: ACTIVE | Noted: 2025-02-07

## 2025-02-07 PROBLEM — H60.8X3 OTHER OTITIS EXTERNA, BILATERAL: Status: ACTIVE | Noted: 2025-02-07

## 2025-02-07 PROBLEM — H35.373 EPIRETINAL MEMBRANE (ERM), BILATERAL: Status: ACTIVE | Noted: 2025-02-07

## 2025-02-07 PROBLEM — M19.90 OSTEOARTHROSIS: Status: ACTIVE | Noted: 2025-02-07

## 2025-02-07 PROBLEM — R01.1 CARDIAC MURMUR, UNSPECIFIED: Status: ACTIVE | Noted: 2025-02-07

## 2025-02-07 PROBLEM — T85.22XA DISLOCATED INTRAOCULAR LENS: Status: ACTIVE | Noted: 2025-02-07

## 2025-02-07 PROBLEM — C73 FOLLICULAR CARCINOMA OF THYROID GLAND (HCC): Status: RESOLVED | Noted: 2024-01-15 | Resolved: 2025-02-07

## 2025-02-07 PROCEDURE — 1090F PRES/ABSN URINE INCON ASSESS: CPT | Performed by: FAMILY MEDICINE

## 2025-02-07 PROCEDURE — 1123F ACP DISCUSS/DSCN MKR DOCD: CPT | Performed by: FAMILY MEDICINE

## 2025-02-07 PROCEDURE — 1036F TOBACCO NON-USER: CPT | Performed by: FAMILY MEDICINE

## 2025-02-07 PROCEDURE — 36415 COLL VENOUS BLD VENIPUNCTURE: CPT | Performed by: FAMILY MEDICINE

## 2025-02-07 PROCEDURE — 99214 OFFICE O/P EST MOD 30 MIN: CPT | Performed by: FAMILY MEDICINE

## 2025-02-07 PROCEDURE — G8427 DOCREV CUR MEDS BY ELIG CLIN: HCPCS | Performed by: FAMILY MEDICINE

## 2025-02-07 PROCEDURE — G8400 PT W/DXA NO RESULTS DOC: HCPCS | Performed by: FAMILY MEDICINE

## 2025-02-07 PROCEDURE — 3017F COLORECTAL CA SCREEN DOC REV: CPT | Performed by: FAMILY MEDICINE

## 2025-02-07 PROCEDURE — 1159F MED LIST DOCD IN RCRD: CPT | Performed by: FAMILY MEDICINE

## 2025-02-07 PROCEDURE — G8420 CALC BMI NORM PARAMETERS: HCPCS | Performed by: FAMILY MEDICINE

## 2025-02-07 RX ORDER — ROSUVASTATIN CALCIUM 10 MG/1
10 TABLET, COATED ORAL DAILY
Qty: 90 TABLET | Refills: 1 | Status: SHIPPED | OUTPATIENT
Start: 2025-02-07

## 2025-02-07 SDOH — ECONOMIC STABILITY: FOOD INSECURITY: WITHIN THE PAST 12 MONTHS, THE FOOD YOU BOUGHT JUST DIDN'T LAST AND YOU DIDN'T HAVE MONEY TO GET MORE.: NEVER TRUE

## 2025-02-07 SDOH — ECONOMIC STABILITY: FOOD INSECURITY: WITHIN THE PAST 12 MONTHS, YOU WORRIED THAT YOUR FOOD WOULD RUN OUT BEFORE YOU GOT MONEY TO BUY MORE.: NEVER TRUE

## 2025-02-07 ASSESSMENT — PATIENT HEALTH QUESTIONNAIRE - PHQ9
1. LITTLE INTEREST OR PLEASURE IN DOING THINGS: NOT AT ALL
SUM OF ALL RESPONSES TO PHQ QUESTIONS 1-9: 0
SUM OF ALL RESPONSES TO PHQ9 QUESTIONS 1 & 2: 0
2. FEELING DOWN, DEPRESSED OR HOPELESS: NOT AT ALL
SUM OF ALL RESPONSES TO PHQ QUESTIONS 1-9: 0

## 2025-02-07 NOTE — PROGRESS NOTES
2/7/25    Mary Kate Rangel  1950    WHIT Hartmann is a 74 y.o. female who presents today for evaluation of:  Chief Complaint   Patient presents with    Nasal Congestion     Runny nose for couple of days    Pain     Some flank pain on right side- concerned about  bladder stone     Orders     Potassium, liver, kidney labs          History of Present Illness  The patient is a 74-year-old woman who presents for evaluation of rhinorrhea, gallstones, rheumatoid arthritis, impaired glucose tolerance, hyperlipidemia, and hypothyroidism.    She has been experiencing persistent rhinorrhea since Monday, accompanied by watery eyes and intermittent sneezing. She reports an itchy throat and sinus pain since Monday. She does not have any fever or cough. She has been using a humidifier at home to alleviate her nasal congestion, but the symptoms persist. She expresses concern about the efficacy of Flonase in managing her symptoms. She is interested in trying allergy medication. She sought urgent care in October 2024 for similar symptoms, where she was prescribed Zyrtec and steroids, which provided relief.    She reports intermittent RT flank pain, which she attributes to a previously diagnosed kidney stone. She was scheduled for surgical intervention last year but fell ill and did not proceed with the surgery. The pain is described as mild and non-bothersome. She underwent an ultrasound at [facility], which confirmed the presence of a large kidney stone. She is seeking reassurance that the stone is not causing her current symptoms.    She has discontinued Plaquenil 200 mg due to gastrointestinal side effects and plans to discuss this with her rheumatologist during their upcoming appointment on Thursday. She has been off hydroxychloroquine for over a month and reports no arm pain or movement restrictions. However, she experienced increased shoulder pain and stomach upset while on the medication.    She is interested in

## 2025-02-08 LAB
ALBUMIN SERPL-MCNC: 4.4 G/DL (ref 3.4–5)
ALBUMIN/GLOB SERPL: 1.3 {RATIO} (ref 1.1–2.2)
ALP SERPL-CCNC: 74 U/L (ref 40–129)
ALT SERPL-CCNC: 13 U/L (ref 10–40)
ANION GAP SERPL CALCULATED.3IONS-SCNC: 13 MMOL/L (ref 3–16)
AST SERPL-CCNC: 22 U/L (ref 15–37)
BASOPHILS # BLD: 0 K/UL (ref 0–0.2)
BASOPHILS NFR BLD: 0.6 %
BILIRUB SERPL-MCNC: <0.2 MG/DL (ref 0–1)
BUN SERPL-MCNC: 9 MG/DL (ref 7–20)
CALCIUM SERPL-MCNC: 9.5 MG/DL (ref 8.3–10.6)
CHLORIDE SERPL-SCNC: 103 MMOL/L (ref 99–110)
CHOLEST SERPL-MCNC: 215 MG/DL (ref 0–199)
CO2 SERPL-SCNC: 23 MMOL/L (ref 21–32)
CREAT SERPL-MCNC: 0.5 MG/DL (ref 0.6–1.2)
DEPRECATED RDW RBC AUTO: 14.9 % (ref 12.4–15.4)
EOSINOPHIL # BLD: 0.1 K/UL (ref 0–0.6)
EOSINOPHIL NFR BLD: 1.5 %
EST. AVERAGE GLUCOSE BLD GHB EST-MCNC: 99.7 MG/DL
GFR SERPLBLD CREATININE-BSD FMLA CKD-EPI: >90 ML/MIN/{1.73_M2}
GLUCOSE SERPL-MCNC: 90 MG/DL (ref 70–99)
HBA1C MFR BLD: 5.1 %
HCT VFR BLD AUTO: 39.4 % (ref 36–48)
HDLC SERPL-MCNC: 61 MG/DL (ref 40–60)
HGB BLD-MCNC: 13.4 G/DL (ref 12–16)
LDLC SERPL CALC-MCNC: 114 MG/DL
LYMPHOCYTES # BLD: 1.3 K/UL (ref 1–5.1)
LYMPHOCYTES NFR BLD: 24.2 %
MCH RBC QN AUTO: 31.4 PG (ref 26–34)
MCHC RBC AUTO-ENTMCNC: 33.9 G/DL (ref 31–36)
MCV RBC AUTO: 92.7 FL (ref 80–100)
MONOCYTES # BLD: 1.4 K/UL (ref 0–1.3)
MONOCYTES NFR BLD: 26.2 %
NEUTROPHILS # BLD: 2.6 K/UL (ref 1.7–7.7)
NEUTROPHILS NFR BLD: 47.5 %
PLATELET # BLD AUTO: 255 K/UL (ref 135–450)
PMV BLD AUTO: 8.6 FL (ref 5–10.5)
POTASSIUM SERPL-SCNC: 3.7 MMOL/L (ref 3.5–5.1)
PROT SERPL-MCNC: 7.9 G/DL (ref 6.4–8.2)
RBC # BLD AUTO: 4.26 M/UL (ref 4–5.2)
SODIUM SERPL-SCNC: 139 MMOL/L (ref 136–145)
TRIGL SERPL-MCNC: 199 MG/DL (ref 0–150)
VLDLC SERPL CALC-MCNC: 40 MG/DL
WBC # BLD AUTO: 5.5 K/UL (ref 4–11)

## 2025-02-09 NOTE — PROGRESS NOTES
RHEUMATOLOGY FOLLOW-UP VISIT    2025      Patient Name: Mary Kate Rangel  : 1950  Medical Record: [id number]      CHIEF COMPLAINT  Seropositive rheumatoid arthritis  Chronic left shoulder pain  Elevated ESR  Positive CONSTANTINO 1: 320    Pertinent Problems  HX of follicular carcinoma s/p thyroidectomy   S/p hysterectomy   S/p tonsillectomy   Hx of uveitis ~ 15 years ago  Latent TB: not requiring treatment at this time given duration of stay in [place]. She has been cleared by ID ( Dr Nieto)     HISTORY OF PRESENT ILLNESS    Mary Kate Rangel is a 74 y.o. female who established on 2024. Symptom onset > 10 years ago with bilateral knee pain then hand, shoulders and rib pain began. She saw ortho doctor for bilateral shoulder who recommended PT that initially helped her right shoulder. Repeat PT fairly helped her left shoulder. She is also undergoing w/u for elevated ESR with oncology. So far she takes tylenol only for joint pain.    LCV: 24   positive  Creatinine normal  LFT normal  ESR 32 H  CRP 5.5H>>0.6 normal   RF normal  CONSTANTINO 1: 160 speckled  Other subserologies neg   WBC/Hb/platelets normal  Plaquenil was started on 2024   Plaquenil eye exam performed on 7/3/2024 showed no Plaquenil retinopathy or maculopathy observed at this time.  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

## 2025-02-13 ENCOUNTER — OFFICE VISIT (OUTPATIENT)
Age: 75
End: 2025-02-13
Payer: MEDICARE

## 2025-02-13 VITALS
SYSTOLIC BLOOD PRESSURE: 119 MMHG | BODY MASS INDEX: 23.08 KG/M2 | OXYGEN SATURATION: 99 % | HEART RATE: 83 BPM | DIASTOLIC BLOOD PRESSURE: 76 MMHG | WEIGHT: 126.2 LBS

## 2025-02-13 DIAGNOSIS — Z79.899 ENCOUNTER FOR MONITORING OF HYDROXYCHLOROQUINE THERAPY: ICD-10-CM

## 2025-02-13 DIAGNOSIS — Z51.81 ENCOUNTER FOR MONITORING OF HYDROXYCHLOROQUINE THERAPY: ICD-10-CM

## 2025-02-13 DIAGNOSIS — M05.79 RHEUMATOID ARTHRITIS INVOLVING MULTIPLE SITES WITH POSITIVE RHEUMATOID FACTOR (HCC): Primary | ICD-10-CM

## 2025-02-13 DIAGNOSIS — R76.8 ANA POSITIVE: ICD-10-CM

## 2025-02-13 PROCEDURE — 3017F COLORECTAL CA SCREEN DOC REV: CPT | Performed by: STUDENT IN AN ORGANIZED HEALTH CARE EDUCATION/TRAINING PROGRAM

## 2025-02-13 PROCEDURE — 1090F PRES/ABSN URINE INCON ASSESS: CPT | Performed by: STUDENT IN AN ORGANIZED HEALTH CARE EDUCATION/TRAINING PROGRAM

## 2025-02-13 PROCEDURE — 99213 OFFICE O/P EST LOW 20 MIN: CPT | Performed by: STUDENT IN AN ORGANIZED HEALTH CARE EDUCATION/TRAINING PROGRAM

## 2025-02-13 PROCEDURE — G8420 CALC BMI NORM PARAMETERS: HCPCS | Performed by: STUDENT IN AN ORGANIZED HEALTH CARE EDUCATION/TRAINING PROGRAM

## 2025-02-13 PROCEDURE — 1123F ACP DISCUSS/DSCN MKR DOCD: CPT | Performed by: STUDENT IN AN ORGANIZED HEALTH CARE EDUCATION/TRAINING PROGRAM

## 2025-02-13 PROCEDURE — 1036F TOBACCO NON-USER: CPT | Performed by: STUDENT IN AN ORGANIZED HEALTH CARE EDUCATION/TRAINING PROGRAM

## 2025-02-13 PROCEDURE — G8427 DOCREV CUR MEDS BY ELIG CLIN: HCPCS | Performed by: STUDENT IN AN ORGANIZED HEALTH CARE EDUCATION/TRAINING PROGRAM

## 2025-02-13 PROCEDURE — 99214 OFFICE O/P EST MOD 30 MIN: CPT | Performed by: STUDENT IN AN ORGANIZED HEALTH CARE EDUCATION/TRAINING PROGRAM

## 2025-02-13 PROCEDURE — G8400 PT W/DXA NO RESULTS DOC: HCPCS | Performed by: STUDENT IN AN ORGANIZED HEALTH CARE EDUCATION/TRAINING PROGRAM

## 2025-02-13 PROCEDURE — 1159F MED LIST DOCD IN RCRD: CPT | Performed by: STUDENT IN AN ORGANIZED HEALTH CARE EDUCATION/TRAINING PROGRAM

## 2025-02-13 NOTE — PATIENT INSTRUCTIONS
We are committed to providing you the best care possible.    If you receive a survey after visiting one of our offices, please take time to share your experience concerning your physician office visit.  These surveys are confidential and no health information about you is shared.    We are eager to improve for you and we are counting on your feedback to help make that happen.        Patient Instructions  Stop plaquenil   Call me if  you start experiencing more joint and stiffness  RTC in 3 months w/NP

## 2025-02-14 ENCOUNTER — TELEPHONE (OUTPATIENT)
Dept: BARIATRICS/WEIGHT MGMT | Age: 75
End: 2025-02-14

## 2025-02-14 NOTE — TELEPHONE ENCOUNTER
K80.20 (ICD-10-CM) - Calculus of gallbladder without cholecystitis without obstruction    Ref to dr ugalde      Called no answer *1

## 2025-07-03 ENCOUNTER — TELEPHONE (OUTPATIENT)
Dept: FAMILY MEDICINE CLINIC | Age: 75
End: 2025-07-03